# Patient Record
Sex: MALE | Race: WHITE | NOT HISPANIC OR LATINO | Employment: FULL TIME | URBAN - METROPOLITAN AREA
[De-identification: names, ages, dates, MRNs, and addresses within clinical notes are randomized per-mention and may not be internally consistent; named-entity substitution may affect disease eponyms.]

---

## 2018-11-16 NOTE — PROGRESS NOTES
"  SUBJECTIVE:   Walt Tompkins is a 33 year old male who presents to clinic today for the following health issues:      History of Present Illness     Diet:  Regular (no restrictions)  Frequency of exercise:  1 day/week  Duration of exercise:  N/A  Taking medications regularly:  Yes  Medication side effects:  None  Additional concerns today:  Yes     Patient in clinic today to establish care. [facility]     He has bumps under his skin in multiple areas and some are painful.     Answers for HPI/ROS submitted by the patient on 11/21/2018   PHQ-2 Score: 1    Problem list and histories reviewed & adjusted, as indicated.  Additional history: none    ROS:  {ROS COMP:151079}    OBJECTIVE:     /82  Pulse 83  Temp 96.9  F (36.1  C) (Temporal)  Resp 16  Ht 5' 11.65\" (1.82 m)  Wt 243 lb (110.2 kg)  SpO2 97%  BMI 33.28 kg/m2  Body mass index is 33.28 kg/(m^2).  {Exam List:302643}      ASSESSMENT/PLAN:       ICD-10-CM    1. Screening for HIV (human immunodeficiency virus) Z11.4    2. Need for prophylactic vaccination and inoculation against influenza Z23    3. Need for prophylactic vaccination with tetanus-diphtheria (Td) Z23          Can Heard PA-C  [facility]"

## 2018-11-21 ENCOUNTER — OFFICE VISIT (OUTPATIENT)
Dept: FAMILY MEDICINE | Facility: OTHER | Age: 33
End: 2018-11-21
Payer: COMMERCIAL

## 2018-11-21 VITALS
RESPIRATION RATE: 16 BRPM | WEIGHT: 243 LBS | HEART RATE: 83 BPM | OXYGEN SATURATION: 97 % | DIASTOLIC BLOOD PRESSURE: 82 MMHG | SYSTOLIC BLOOD PRESSURE: 108 MMHG | TEMPERATURE: 96.9 F | BODY MASS INDEX: 32.91 KG/M2 | HEIGHT: 72 IN

## 2018-11-21 DIAGNOSIS — H91.92 DECREASED HEARING OF LEFT EAR: ICD-10-CM

## 2018-11-21 DIAGNOSIS — D17.9 LIPOMA, UNSPECIFIED SITE: ICD-10-CM

## 2018-11-21 DIAGNOSIS — E66.09 CLASS 1 OBESITY DUE TO EXCESS CALORIES WITHOUT SERIOUS COMORBIDITY WITH BODY MASS INDEX (BMI) OF 33.0 TO 33.9 IN ADULT: ICD-10-CM

## 2018-11-21 DIAGNOSIS — E66.811 CLASS 1 OBESITY DUE TO EXCESS CALORIES WITHOUT SERIOUS COMORBIDITY WITH BODY MASS INDEX (BMI) OF 33.0 TO 33.9 IN ADULT: ICD-10-CM

## 2018-11-21 DIAGNOSIS — Z23 NEED FOR PROPHYLACTIC VACCINATION WITH TETANUS-DIPHTHERIA (TD): ICD-10-CM

## 2018-11-21 DIAGNOSIS — Z00.00 ENCOUNTER FOR ROUTINE ADULT HEALTH EXAMINATION WITHOUT ABNORMAL FINDINGS: Primary | ICD-10-CM

## 2018-11-21 DIAGNOSIS — Z13.6 CARDIOVASCULAR SCREENING; LDL GOAL LESS THAN 160: ICD-10-CM

## 2018-11-21 DIAGNOSIS — Z13.1 SCREENING FOR DIABETES MELLITUS: ICD-10-CM

## 2018-11-21 DIAGNOSIS — H72.92 PERFORATION OF TYMPANIC MEMBRANE, LEFT: ICD-10-CM

## 2018-11-21 PROCEDURE — 99385 PREV VISIT NEW AGE 18-39: CPT | Mod: 25 | Performed by: PHYSICIAN ASSISTANT

## 2018-11-21 PROCEDURE — 90715 TDAP VACCINE 7 YRS/> IM: CPT | Performed by: PHYSICIAN ASSISTANT

## 2018-11-21 PROCEDURE — 90471 IMMUNIZATION ADMIN: CPT | Performed by: PHYSICIAN ASSISTANT

## 2018-11-21 NOTE — PATIENT INSTRUCTIONS
Preventive Health Recommendations  Male Ages 26 - 39    Yearly exam:             See your health care provider every year in order to  o   Review health changes.   o   Discuss preventive care.    o   Review your medicines if your doctor has prescribed any.    You should be tested each year for STDs (sexually transmitted diseases), if you re at risk.     After age 35, talk to your provider about cholesterol testing. If you are at risk for heart disease, have your cholesterol tested at least every 5 years.     If you are at risk for diabetes, you should have a diabetes test (fasting glucose).  Shots: Get a flu shot each year. Get a tetanus shot every 10 years.     Nutrition:    Eat at least 5 servings of fruits and vegetables daily.     Eat whole-grain bread, whole-wheat pasta and brown rice instead of white grains and rice.     Get adequate Calcium and Vitamin D.     Lifestyle    Exercise for at least 150 minutes a week (30 minutes a day, 5 days a week). This will help you control your weight and prevent disease.     Limit alcohol to one drink per day.     No smoking.     Wear sunscreen to prevent skin cancer.     See your dentist every six months for an exam and cleaning.     If you would like those lipomas removed, let me know.    Follow up in 1 month to recheck the left ear drum perforation. You can try Sudafed or Claritin to help dry up the inner ear fluid.

## 2018-11-21 NOTE — NURSING NOTE
"Chief Complaint   Patient presents with     Establish Care     Panel Management     phq, tetanus, hiv, flu, ashley        Initial /82  Pulse 83  Temp 96.9  F (36.1  C) (Temporal)  Resp 16  Ht 5' 11.65\" (1.82 m)  Wt 243 lb (110.2 kg)  SpO2 97%  BMI 33.28 kg/m2 Estimated body mass index is 33.28 kg/(m^2) as calculated from the following:    Height as of this encounter: 5' 11.65\" (1.82 m).    Weight as of this encounter: 243 lb (110.2 kg).  Medication Reconciliation: complete    Anisa Green, ROBERTO      "

## 2018-11-21 NOTE — PROGRESS NOTES
SUBJECTIVE:   CC: Walt Tompkins is an 33 year old male who presents for preventative health visit.     Healthy Habits:    Do you get at least three servings of calcium containing foods daily (dairy, green leafy vegetables, etc.)? yes    Amount of exercise or daily activities, outside of work: 1 day per week    Problems taking medications regularly not applicable    Medication side effects: No    Have you had an eye exam in the past two years? no    Do you see a dentist twice per year? yes    Do you have sleep apnea, excessive snoring or daytime drowsiness?no     Patient in clinic today to establish care. [facility]     He has bumps under his skin in multiple areas and some are painful. He states his dad has them as well.    He has had decreased hearing in the left ear for the past month or so but it is slowly starting to improve. He does have occasional seasonal allergies. He denies any ear pain.     Today's PHQ-2 Score:   PHQ-2 ( 1999 [employer]) 11/21/2018   Q1: Little interest or pleasure in doing things 0   Q2: Feeling down, depressed or hopeless 1   PHQ-2 Score 1   Q1: Little interest or pleasure in doing things Not at all   Q2: Feeling down, depressed or hopeless Several days   PHQ-2 Score 1       Abuse: Current or Past(Physical, Sexual or Emotional)- No  Do you feel safe in your environment - Yes    Social History   Substance Use Topics     Smoking status: Current Every Day Smoker     Smokeless tobacco: Former User     Alcohol use Yes      Comment: once per week      If you drink alcohol do you typically have >3 drinks per day or >7 drinks per week? No                      Last PSA: No results found for: PSA    Reviewed orders with patient. Reviewed health maintenance and updated orders accordingly - Yes    Reviewed and updated as needed this visit by clinical staff  Tobacco  Allergies  Meds  Problems  Med Hx  Surg Hx  Fam Hx  Soc Hx       "  Reviewed and updated as needed this visit by Provider  Allergies  Meds  Problems        ROS:  GENERAL: Denies fever, fatigue, weakness, weight gain, or weight loss.  HEENT: Eyes-Denies pain, redness, loss of vision, double or blurred vision.     Ears/Nose- +Decreased hearing on the left. Denies tinnitus, epistaxis, decreased sense of smell, nasal congestion. Denies loss of sense of taste, dry mouth, or sore throat.   CARDIOVASCULAR: Denies chest pain, shortness of breath, irregular heartbeats, palpitations, or edema.  RESPIRATORY: Denies cough, hemoptysis, and shortness of breath.  GASTROINTESTINAL: Denies nausea, vomiting, change in appetite, abdominal pain, diarrhea, or constipation.  GENITOURINARY: Denies increased frequency, urgency, dysuria, hematuria, or incontinence.   MUSCULOSKELETAL: Denies myalgias, arthralgias, or muscle weakness.  SKIN: +Nodules under skin. Denies easy bruising, redness, rash, or dry skin.   NEUROLOGIC: Denies headache, fainting, dizziness, memory loss, numbness, tingling, or seizures.  PSYCHIATRIC: Denies depression, anxiety, mood swings, and thoughts of suicide.  ENDOCRINE: Denies heat and cold intolerance, polydipsia, or polyuria.   HEMATOLOGIC/LYMPHATIC: Denies easy bleeding or lymphadenopathy.   ALLERGIC/IMMUNOLOGIC: Denies rhinitis, asthma, skin sensitivity.     OBJECTIVE:   /82  Pulse 83  Temp 96.9  F (36.1  C) (Temporal)  Resp 16  Ht 5' 11.65\" (1.82 m)  Wt 243 lb (110.2 kg)  SpO2 97%  BMI 33.28 kg/m2  EXAM:  GENERAL: healthy, alert and no distress  EYES: Eyes grossly normal to inspection, PERRL and conjunctivae and sclerae normal  HENT: Right ear canal and TM's normal with minimal serous effusion.. Left ear canal normal, left TM with medial TM perforation with serous effusion. No current drainage or erythema. Nose and mouth without ulcers or lesions  NECK: no adenopathy, no asymmetry, masses, or scars and thyroid normal to palpation  RESP: lungs clear to " auscultation - no rales, rhonchi or wheezes  CV: regular rate and rhythm, normal S1 S2, no S3 or S4, no murmur, click or rub, no peripheral edema and peripheral pulses strong  ABDOMEN: soft, nontender, no hepatosplenomegaly, no masses and bowel sounds normal   (male): normal male uncircumcised genitalia without urethral discharge. Small amount of whitish discoloration over the tip of the penis. Nontender. no hernia  MS: no gross musculoskeletal defects noted, no edema. FROM to all extremities. No spinal tenderness.   SKIN: Multiple rubbery, mobile nodules including right forearm, left elbow area and right low back. Right low back lesion slightly tender to palpation.   NEURO: Normal strength and tone, mentation intact and speech normal. Cranial nerves II-XII are grossly intact. DTRs are 2+/4 throughout and symmetric. Gait is stable.  PSYCH: mentation appears normal, affect normal/bright    ASSESSMENT/PLAN:       ICD-10-CM    1. Encounter for routine adult health examination without abnormal findings Z00.00 Basic metabolic panel  (Ca, Cl, CO2, Creat, Gluc, K, Na, BUN)     Lipid panel reflex to direct LDL Fasting   2. Lipoma, unspecified site D17.9    3. Decreased hearing of left ear H91.92    4. Perforation of tympanic membrane, left H72.92    5. Class 1 obesity due to excess calories without serious comorbidity with body mass index (BMI) of 33.0 to 33.9 in adult E66.09     Z68.33    6. CARDIOVASCULAR SCREENING; LDL GOAL LESS THAN 160 Z13.6 Lipid panel reflex to direct LDL Fasting   7. Screening for diabetes mellitus Z13.1 Basic metabolic panel  (Ca, Cl, CO2, Creat, Gluc, K, Na, BUN)   8. Need for prophylactic vaccination with tetanus-diphtheria (Td) Z23 TDAP, IM (10 - 64 YRS) - Adacel       2. Multiple lipomas, some of which are painful at times but he does not want to pursue removal at this time. If he changes his mind or they start to become more painful, he will let me know and will send to generally surgery for  "excision.    3-4. Decreased hearing of left ear recently likely due to TM perforation. I recommend Sudafed or Claritin to help dry up the bilateral TM effusions and will plan on follow up in 1 month to recheck the perforation. If still not healing, will refer to ENT.    5. I discussed the importance of routine exercise at least 3-5 days per week along with a healthier diet, cutting back on the carbs, fatty foods and portion sizes.    6-7. He will completed fasting labs next week.    8. Tdap administered by MA.    He states the whitish discoloration over the tip of the penis has been present for 10 years without change and it is asymptomatic so will continue to monitor. Encouraged to keep foreskin clean.     COUNSELING:  Reviewed preventive health counseling, as reflected in patient instructions       Regular exercise       Healthy diet/nutrition    BP Readings from Last 1 Encounters:   11/21/18 108/82     Estimated body mass index is 33.28 kg/(m^2) as calculated from the following:    Height as of this encounter: 5' 11.65\" (1.82 m).    Weight as of this encounter: 243 lb (110.2 kg).    BP Screening:   Last 3 BP Readings:    BP Readings from Last 3 Encounters:   11/21/18 108/82   10/24/13 123/74     Weight management plan: Discussed healthy diet and exercise guidelines and patient will follow up in 12 months in clinic to re-evaluate.    Patient reports that he has been smoking E-cigs.  He has quit using smokeless tobacco.      Counseling Resources:  ATP IV Guidelines  Pooled Cohorts Equation Calculator  FRAX Risk Assessment  ICSI Preventive Guidelines  Dietary Guidelines for Americans, 2010  USDA's MyPlate  ASA Prophylaxis  Lung CA Screening    Can Heard PA-C  [facility]  "

## 2018-11-21 NOTE — MR AVS SNAPSHOT
After Visit Summary   11/21/2018    Walt Tompkins    MRN: [id number]           Patient Information     Date Of Birth          1985        Visit Information        Provider Department      11/21/2018 3:00 PM Can Heard PA-C [facility]        Today's Diagnoses     Encounter for routine adult health examination without abnormal findings    -  1    Lipoma, unspecified site        Decreased hearing of left ear        Perforation of tympanic membrane, left        Class 1 obesity due to excess calories without serious comorbidity with body mass index (BMI) of 33.0 to 33.9 in adult        CARDIOVASCULAR SCREENING; LDL GOAL LESS THAN 160        Screening for diabetes mellitus        Need for prophylactic vaccination with tetanus-diphtheria (Td)          Care Instructions      Preventive Health Recommendations  Male Ages 26 - 39    Yearly exam:             See your health care provider every year in order to  o   Review health changes.   o   Discuss preventive care.    o   Review your medicines if your doctor has prescribed any.    You should be tested each year for STDs (sexually transmitted diseases), if you re at risk.     After age 35, talk to your provider about cholesterol testing. If you are at risk for heart disease, have your cholesterol tested at least every 5 years.     If you are at risk for diabetes, you should have a diabetes test (fasting glucose).  Shots: Get a flu shot each year. Get a tetanus shot every 10 years.     Nutrition:    Eat at least 5 servings of fruits and vegetables daily.     Eat whole-grain bread, whole-wheat pasta and brown rice instead of white grains and rice.     Get adequate Calcium and Vitamin D.     Lifestyle    Exercise for at least 150 minutes a week (30 minutes a day, 5 days a week). This will help you control your weight and prevent disease.     Limit alcohol to one drink per day.     No smoking.     Wear sunscreen to prevent skin  cancer.     See your dentist every six months for an exam and cleaning.     If you would like those lipomas removed, let me know.    Follow up in 1 month to recheck the left ear drum perforation. You can try Sudafed or Claritin to help dry up the inner ear fluid.            Follow-ups after your visit        Follow-up notes from your care team     Return in about 1 month (around 12/21/2018).      Your next 10 appointments already scheduled     Nov 28, 2018  7:45 AM CST   LAB with [facility]   [facility]    [address] [id number] [phone]           Please do not eat 10-12 hours before your appointment if you are coming in fasting for labs on lipids, cholesterol, or glucose (sugar). This does not apply to pregnant women. Water, hot tea and black coffee (with nothing added) are okay. Do not drink other fluids, diet soda or chew gum.            Dec 19, 2018  3:00 PM CST   Office Visit with Can Heard PA-C   [facility] ([facility]    [address] [id number] [phone]           Bring a current list of meds and any records pertaining to this visit. For Physicals, please bring immunization records and any forms needing to be filled out. Please arrive 10 minutes early to complete paperwork.              Future tests that were ordered for you today     Open Future Orders        Priority Expected Expires Ordered    Basic metabolic panel  (Ca, Cl, CO2, Creat, Gluc, K, Na, BUN) Routine 11/27/2018 11/21/2019 11/21/2018    Lipid panel reflex to direct LDL Fasting Routine 11/27/2018 11/21/2019 11/21/2018            Who to contact     If you have questions or need follow up information about today's clinic visit or your schedule please contact [facility] directly at [phone].  Normal or non-critical lab and imaging results will be communicated to you by MyChart, letter or  "phone within 4 business days after the clinic has received the results. If you do not hear from us within 7 days, please contact the clinic through [employer] or phone. If you have a critical or abnormal lab result, we will notify you by phone as soon as possible.  Submit refill requests through [employer] or call your pharmacy and they will forward the refill request to us. Please allow 3 business days for your refill to be completed.          Additional Information About Your Visit        Care EveryWhere ID     This is your Care EveryWhere ID. This could be used by other organizations to access your [facility] medical records  [id number]        Your Vitals Were     Pulse Temperature Respirations Height Pulse Oximetry BMI (Body Mass Index)    83 96.9  F (36.1  C) (Temporal) 16 5' 11.65\" (1.82 m) 97% 33.28 kg/m2       Blood Pressure from Last 3 Encounters:   11/21/18 108/82   10/24/13 123/74    Weight from Last 3 Encounters:   11/21/18 243 lb (110.2 kg)   10/24/13 212 lb (96.2 kg)              We Performed the Following     TDAP, IM (10 - 64 YRS) - Adacel        Primary Care Provider Office Phone # Fax #    Can Heard PA-C [phone]       [address] [place] [id number]        Equal Access to Services     [facility] : Hadii aad ku hadasho Soomaali, waaxda luqadaha, qaybta kaalmada adeegyada, scott garces hayantony jj . So [facility] [phone].    ATENCIÓN: Si habla español, tiene a mayer disposición servicios gratuitos de asistencia lingüística. Dereje al [phone].    We comply with applicable federal civil rights laws and [place] laws. We do not discriminate on the basis of race, color, national origin, age, disability, sex, sexual orientation, or gender identity.            Thank you!     Thank you for choosing [facility]  for your care. Our goal is always to provide you with excellent care. Hearing back from our patients is one way we can continue to " improve our services. Please take a few minutes to complete the written survey that you may receive in the mail after your visit with us. Thank you!             Your Updated Medication List - Protect others around you: Learn how to safely use, store and throw away your medicines at www.disposemymeds.org.      Notice  As of 11/21/2018  3:28 PM    You have not been prescribed any medications.

## 2018-11-21 NOTE — NURSING NOTE
Screening Questionnaire for Adult Immunization    Are you sick today?   No   Do you have allergies to medications, food, a vaccine component or latex?   No   Have you ever had a serious reaction after receiving a vaccination?   No   Do you have a long-term health problem with heart disease, lung disease, asthma, kidney disease, metabolic disease (e.g. diabetes), anemia, or other blood disorder?   No   Do you have cancer, leukemia, HIV/AIDS, or any other immune system problem?   No   In the past 3 months, have you taken medications that affect  your immune system, such as prednisone, other steroids, or anticancer drugs; drugs for the treatment of rheumatoid arthritis, Crohn s disease, or psoriasis; or have you had radiation treatments?   No   Have you had a seizure, or a brain or other nervous system problem?   No   During the past year, have you received a transfusion of blood or blood     products, or been given immune (gamma) globulin or antiviral drug?   No   For women: Are you pregnant or is there a chance you could become        pregnant during the next month?   No   Have you received any vaccinations in the past 4 weeks?   No     Immunization questionnaire answers were all negative.        Per orders of DANNIE, injection of Adacel given by Anisa Green. Patient instructed to remain in clinic for 15 minutes afterwards, and to report any adverse reaction to me immediately.    Prior to injection verified patient identity using patient's name and date of birth. Anisa Green, CMA     Screening performed by Anisa Green on 11/21/2018 at 3:32 PM.

## 2019-05-08 NOTE — PROGRESS NOTES
"  SUBJECTIVE:   Walt Tompkins is a 33 year old male who presents to clinic today for the following health issues:    HPI     Patient in clinic today to discuss prescription to quit nicotine. He was a previous tobacco chewer for 7 years, quitting a year and a half ago and going to the E cigarette instead. He is down to 3 mg of nicotine, going through a 60 mL bottle every 7 days. He has tried patches and gum in the past without benefit. He would like to discuss Chantix or Wellbutrin. He has never smoked cigarettes.     Additional history: none    Reviewed and updated as needed this visit by clinical staff  Tobacco  Allergies  Meds  Problems  Med Hx  Surg Hx  Fam Hx  Soc Hx        Reviewed and updated as needed this visit by Provider  Tobacco  Allergies  Meds  Problems  Med Hx  Surg Hx  Fam Hx       ROS:  GENERAL: Denies fever, fatigue, weakness, weight gain, or weight loss.  CARDIOVASCULAR: Denies chest pain, shortness of breath, irregular heartbeats, palpitations, or edema.  RESPIRATORY: Denies cough, hemoptysis, and shortness of breath.  PSYCHIATRIC: Denies depression, anxiety, mood swings, and thoughts of suicide.    OBJECTIVE:     /78   Pulse 78   Temp 99.5  F (37.5  C) (Temporal)   Resp 16   Ht 1.816 m (5' 11.5\")   Wt 105.2 kg (232 lb)   SpO2 99%   BMI 31.91 kg/m    Body mass index is 31.91 kg/m .  GENERAL: healthy, alert and no distress  RESP: lungs clear to auscultation - no rales, rhonchi or wheezes  CV: regular rate and rhythm, normal S1 S2, no S3 or S4, no murmur, click or rub      ASSESSMENT/PLAN:       ICD-10-CM    1. Nicotine abuse Z72.0 varenicline (CHANTIX STARTING MONTH PAK) 0.5 MG X 11 & 1 MG X 42 tablet     varenicline (CHANTIX CONTINUING MONTH PAK) 1 MG tablet       He would like to quit nicotine use, specifically his E-cig. He has tried nicotine gum and patches without benefit. I recommend trying Chantix. I discussed common side effects, the most common of which is " abnormal dreams. He does not have a history of anxiety or depression. He will take this as prescribed and if he does not tolerate it or it is not beneficial, he will let me know. I recommend he take it for at least 3 months and set a quit date for 1 week after starting it.       Can Heard PA-C  [facility]

## 2019-05-15 ENCOUNTER — OFFICE VISIT (OUTPATIENT)
Dept: FAMILY MEDICINE | Facility: OTHER | Age: 34
End: 2019-05-15
Payer: COMMERCIAL

## 2019-05-15 VITALS
RESPIRATION RATE: 16 BRPM | SYSTOLIC BLOOD PRESSURE: 122 MMHG | HEART RATE: 78 BPM | DIASTOLIC BLOOD PRESSURE: 78 MMHG | WEIGHT: 232 LBS | BODY MASS INDEX: 31.42 KG/M2 | OXYGEN SATURATION: 99 % | TEMPERATURE: 99.5 F | HEIGHT: 72 IN

## 2019-05-15 DIAGNOSIS — Z72.0 NICOTINE ABUSE: Primary | ICD-10-CM

## 2019-05-15 PROCEDURE — 99213 OFFICE O/P EST LOW 20 MIN: CPT | Performed by: PHYSICIAN ASSISTANT

## 2019-05-15 RX ORDER — VARENICLINE TARTRATE 1 MG/1
1 TABLET, FILM COATED ORAL 2 TIMES DAILY
Qty: 60 TABLET | Refills: 2 | Status: SHIPPED | OUTPATIENT
Start: 2019-05-15 | End: 2019-05-17

## 2019-05-15 ASSESSMENT — MIFFLIN-ST. JEOR: SCORE: 2037.41

## 2019-05-15 ASSESSMENT — PAIN SCALES - GENERAL: PAINLEVEL: NO PAIN (0)

## 2019-05-15 NOTE — PATIENT INSTRUCTIONS
Will start you on Chantix to take as directed.  If you have any intolerable side effects, let me know.  Set a quit date for a week after starting this.  If not helping you quit, let me know.

## 2019-05-16 DIAGNOSIS — Z72.0 NICOTINE ABUSE: ICD-10-CM

## 2019-05-16 NOTE — TELEPHONE ENCOUNTER
Reason for Call:  Medication or medication refill:    Do you use a [facility]?  Name [facility]    Name of the medication requested: chantix    Other request: Patient requested refill from pharmacy. He would like refill asap    Can we leave a detailed message on this number? YES    Phone number patient can be reached at: Home number on file [phone] (home)    Best Time: any    Call taken on 5/16/2019 at 3:47 PM by Seema Mohan

## 2019-05-16 NOTE — TELEPHONE ENCOUNTER
[facility].     Jamila Hammond MA

## 2019-05-17 RX ORDER — VARENICLINE TARTRATE 1 MG/1
1 TABLET, FILM COATED ORAL 2 TIMES DAILY
Qty: 60 TABLET | Refills: 2 | Status: SHIPPED | OUTPATIENT
Start: 2019-05-17 | End: 2019-08-01

## 2019-07-08 ENCOUNTER — OFFICE VISIT (OUTPATIENT)
Dept: FAMILY MEDICINE | Facility: OTHER | Age: 34
End: 2019-07-08
Payer: COMMERCIAL

## 2019-07-08 VITALS
RESPIRATION RATE: 16 BRPM | BODY MASS INDEX: 32.23 KG/M2 | TEMPERATURE: 98 F | WEIGHT: 238 LBS | HEIGHT: 72 IN | HEART RATE: 78 BPM | OXYGEN SATURATION: 98 % | DIASTOLIC BLOOD PRESSURE: 82 MMHG | SYSTOLIC BLOOD PRESSURE: 120 MMHG

## 2019-07-08 DIAGNOSIS — D17.9 LIPOMA, UNSPECIFIED SITE: ICD-10-CM

## 2019-07-08 DIAGNOSIS — Z13.1 SCREENING FOR DIABETES MELLITUS: ICD-10-CM

## 2019-07-08 DIAGNOSIS — H61.22 IMPACTED CERUMEN OF LEFT EAR: ICD-10-CM

## 2019-07-08 DIAGNOSIS — M10.9 GOUT INVOLVING TOE, UNSPECIFIED CAUSE, UNSPECIFIED CHRONICITY, UNSPECIFIED LATERALITY: Primary | ICD-10-CM

## 2019-07-08 DIAGNOSIS — Z13.6 CARDIOVASCULAR SCREENING; LDL GOAL LESS THAN 160: ICD-10-CM

## 2019-07-08 LAB
ANION GAP SERPL CALCULATED.3IONS-SCNC: 8 MMOL/L (ref 3–14)
BUN SERPL-MCNC: 15 MG/DL (ref 7–30)
CALCIUM SERPL-MCNC: 9.4 MG/DL (ref 8.5–10.1)
CHLORIDE SERPL-SCNC: 106 MMOL/L (ref 94–109)
CHOLEST SERPL-MCNC: 185 MG/DL
CO2 SERPL-SCNC: 26 MMOL/L (ref 20–32)
CREAT SERPL-MCNC: 0.81 MG/DL (ref 0.66–1.25)
GFR SERPL CREATININE-BSD FRML MDRD: >90 ML/MIN/{1.73_M2}
GLUCOSE SERPL-MCNC: 95 MG/DL (ref 70–99)
HDLC SERPL-MCNC: 40 MG/DL
LDLC SERPL CALC-MCNC: 97 MG/DL
NONHDLC SERPL-MCNC: 145 MG/DL
POTASSIUM SERPL-SCNC: 4.1 MMOL/L (ref 3.4–5.3)
SODIUM SERPL-SCNC: 140 MMOL/L (ref 133–144)
TRIGL SERPL-MCNC: 240 MG/DL
URATE SERPL-MCNC: 8.2 MG/DL (ref 3.5–7.2)

## 2019-07-08 PROCEDURE — 80061 LIPID PANEL: CPT | Performed by: PHYSICIAN ASSISTANT

## 2019-07-08 PROCEDURE — 36415 COLL VENOUS BLD VENIPUNCTURE: CPT | Performed by: PHYSICIAN ASSISTANT

## 2019-07-08 PROCEDURE — 80048 BASIC METABOLIC PNL TOTAL CA: CPT | Performed by: PHYSICIAN ASSISTANT

## 2019-07-08 PROCEDURE — 84550 ASSAY OF BLOOD/URIC ACID: CPT | Performed by: PHYSICIAN ASSISTANT

## 2019-07-08 PROCEDURE — 99213 OFFICE O/P EST LOW 20 MIN: CPT | Mod: 25 | Performed by: PHYSICIAN ASSISTANT

## 2019-07-08 PROCEDURE — 69210 REMOVE IMPACTED EAR WAX UNI: CPT | Mod: LT | Performed by: PHYSICIAN ASSISTANT

## 2019-07-08 RX ORDER — ALLOPURINOL 100 MG/1
100 TABLET ORAL DAILY
Qty: 30 TABLET | Refills: 5 | Status: SHIPPED | OUTPATIENT
Start: 2019-07-08 | End: 2020-08-06

## 2019-07-08 ASSESSMENT — MIFFLIN-ST. JEOR: SCORE: 2064.62

## 2019-07-08 NOTE — PATIENT INSTRUCTIONS
Will send you to Dr. Maurice to discuss removal of the lipomas.    Will start you on allopurinol to help with the gout and I recommend avoiding exacerbating foods.  Drink plenty of water and continue to work on weight loss with routine exercise.    Follow up in 6 months for a recheck.

## 2019-07-08 NOTE — PROGRESS NOTES
"Subjective     Walt Tompkins is a 33 year old male who presents to clinic today for the following health issues:    History of Present Illness        He eats 2-3 servings of fruits and vegetables daily.He consumes 2 sweetened beverage(s) daily.  He is taking medications regularly.     Gout/ single inflamed joint   Onset: \"first flared up 1 year ago\"    Description:   Location: \"ball of left foot and left knee\"  Joint Swelling: no   Redness: no   Pain: YES    Intensity: \"depends on if I had meat the day before\"    Progression of Symptoms:  intermittent    Accompanying Signs & Symptoms:  Fevers: no     History:   Trauma to the area: no   Previous history of gout: YES   Recent illness:  no     Precipitating factors:   Diet-rich in purine: seafood and red meat - not often  Alcohol use: no   Diuretic use: no     Alleviating factors:  Heat, a lot of water    Therapies Tried and outcome: Aleve - previously had a gout medication from urgent care and it was for 2 weeks    He was diagnosed with his first episode of gout 1 year ago in the right great toe. Ever since then, he has intermittent pain in the left great toe and knee when he eats meat or drinks alcohol. He is wondering if he can get on a medication to help prevent gout. He has cut back significantly on his meat eating and alcohol use.     He has been tobacco free for 40 days.    Concern - Lipoma  Onset: 6 months    Description:   \"both sides of my stomach and on my arms\"    Progression of Symptoms:  worsening    Accompanying Signs & Symptoms:  Pain  Might be getting slightly bigger    Previous history of similar problem:   no    Precipitating factors:   Worsened by: NA    Alleviating factors:  Improved by: NA    Therapies Tried and outcome: none    He has multiple painful lipomas over his abdomen and arms and would like them removed.    He has had decreased hearing in his left ear for the past week or so and wants to see if there is any wax build up. He denies " "any pain or discharge.     Reviewed and updated as needed this visit by Provider  Tobacco  Allergies  Meds  Problems  Med Hx  Surg Hx  Fam Hx      Review of Systems   GENERAL: Denies fever, fatigue, weakness, weight gain, or weight loss.  HEENT: Eyes-Denies pain, redness, loss of vision, double or blurred vision.     Ears/Nose- +Decreased hearing left ear. Denies tinnitus, epistaxis, decreased sense of smell, nasal congestion. Denies loss of sense of taste, dry mouth, or sore throat.   CARDIOVASCULAR: Denies chest pain, shortness of breath, irregular heartbeats, palpitations, or edema.  RESPIRATORY: Denies cough, hemoptysis, and shortness of breath.  SKIN: +Multiple painful lipomas.   MUSCULOSKELETAL: +Intermittent pain of left knee and left great toe.   NEUROLOGIC: Denies headache, fainting, dizziness, memory loss, numbness, tingling, or seizures.      Objective    /82   Pulse 78   Temp 98  F (36.7  C) (Temporal)   Resp 16   Ht 1.816 m (5' 11.5\")   Wt 108 kg (238 lb)   SpO2 98%   BMI 32.73 kg/m    Body mass index is 32.73 kg/m .  Physical Exam   GENERAL: healthy, alert and no distress  EARS: Right ear canal and TM clear. Left ear canal occluded by cerumen. Once removed, TM visualized and clear.   RESP: lungs clear to auscultation - no rales, rhonchi or wheezes  CV: regular rate and rhythm, normal S1 S2, no S3 or S4, no murmur, click or rub  NEURO: Normal strength and tone, mentation intact and speech normal. Gait is stable.  SKIN: Tender, rubbery nodules over lateral lower abdomen bilaterally along with right forearm. Multiple nontender rubbery nodules over bilateral forearms.         Assessment & Plan       ICD-10-CM    1. Gout involving toe, unspecified cause, unspecified chronicity, unspecified laterality M10.9 Uric acid     allopurinol (ZYLOPRIM) 100 MG tablet   2. Lipoma, unspecified site D17.9 GENERAL SURG ADULT REFERRAL   3. Impacted cerumen of left ear H61.22 REMOVE IMPACTED CERUMEN   4. " CARDIOVASCULAR SCREENING; LDL GOAL LESS THAN 160 Z13.6 Lipid panel reflex to direct LDL Fasting   5. Screening for diabetes mellitus Z13.1 Basic metabolic panel  (Ca, Cl, CO2, Creat, Gluc, K, Na, BUN)        1. He had his first episode of gout 1 year ago and has had intermittent pain in the left great toe and knee over the past few months when eating meat or drinking alcohol. I discussed the importance of avoiding excess red meats, seafood and alcohol and he has been cutting down. Will start allopurinol 100 mg daily. Will check uric acid today and will plan on recheck in 6 months unless levels are significantly elevated then will recheck in 2 months. He will continue to work on weight loss and plenty of fluids as well.    2. Will refer him to general surgery to discuss lipoma removal.    3. I removed a large, hard piece of impacted cerumen from his left ear with a cerumen spoon and he tolerated this procedure well.     4-5. Updated non-fasting screening labs ordered.    Follow up in 6 months.       Can Heard PA-C  [facility]

## 2019-07-15 ENCOUNTER — TELEPHONE (OUTPATIENT)
Dept: SURGERY | Facility: CLINIC | Age: 34
End: 2019-07-15

## 2019-07-15 ENCOUNTER — OFFICE VISIT (OUTPATIENT)
Dept: SURGERY | Facility: OTHER | Age: 34
End: 2019-07-15
Attending: PHYSICIAN ASSISTANT
Payer: COMMERCIAL

## 2019-07-15 VITALS
WEIGHT: 235.75 LBS | HEIGHT: 72 IN | SYSTOLIC BLOOD PRESSURE: 126 MMHG | TEMPERATURE: 97.2 F | BODY MASS INDEX: 31.93 KG/M2 | DIASTOLIC BLOOD PRESSURE: 74 MMHG

## 2019-07-15 DIAGNOSIS — D17.1 LIPOMA OF BACK: ICD-10-CM

## 2019-07-15 DIAGNOSIS — D17.20 LIPOMA OF EXTREMITY: ICD-10-CM

## 2019-07-15 DIAGNOSIS — D17.1 ABDOMINAL LIPOMA: Primary | ICD-10-CM

## 2019-07-15 PROCEDURE — 99243 OFF/OP CNSLTJ NEW/EST LOW 30: CPT | Performed by: SURGERY

## 2019-07-15 ASSESSMENT — MIFFLIN-ST. JEOR: SCORE: 2054.41

## 2019-07-15 NOTE — LETTER
7/15/2019         RE: Walt Tompkins  [address] [place] [facility] [address]        Dear Colleague,    Thank you for referring your patient, Walt Tompkins, to the [facility]. Please see a copy of my visit note below.    General Surgery Consultation    Walt Tompkins MRN# [id number]   Age: 33 year old YOB: 1985     Reason for consult: Multiple lipomas                        Assessment and Plan:   I was asked to see this patient at the request of FLOR Alba for evaluation of multiple lipomas.  Walt Tompkins is a 33 year old male who presented with history, exam, laboratory and imaging most consistent with:        ICD-10-CM    1. Abdominal lipoma D17.1 Ewa-Operative Worksheet   2. Lipoma of extremity D17.79 Ewa-Operative Worksheet   3. Lipoma of back D17.1 Ewa-Operative Worksheet       Patient has multiple lipomas through his upper extremities, back, and anterior abdomen.  Thus I recommend we do the back lipomas and a few on the upper extremities were it would be difficult with him being asleep - we can do these int he office while he is awake and as long as he can tolerates them.  The ones along his abdomen are smaller and deeper, thus I recommend we excised these in the OR while he's asleep along with the rest of the upper extremities lipomas that were not removed in the office.  I explained the risks, benefits, and altternatives; he showed understanding and wishes to proceed.      I thank FLOR Alba for the opportunity to participate in the patient's care.           Chief Complaint:   Multiple lipomas     History is obtained from the patient         History of Present Illness:   This patient is a 33 year old  male without a significant past medical history who presents with multiple lipomas throughout his body.  Had them for years; the ones on his upper extremities increasizing in size and number.  Some are causing him pain; thus he  would like to remove them.  No hx of sarcomas; no hx of cancer.  He has multiple throughout his abdomen and a ~3cm in his back.  No issues with anestheisa.  No allergies.  He would like to watch some of his lipomas removed, especially the bigger ones on his arms.            Past Medical History:    has no past medical history on file.          Past Surgical History:   No past surgical history on file.        Medications:     Current Outpatient Medications on File Prior to Visit:  allopurinol (ZYLOPRIM) 100 MG tablet Take 1 tablet (100 mg) by mouth daily   varenicline (CHANTIX CONTINUING MONTH PAK) 1 MG tablet Take 1 tablet (1 mg) by mouth 2 times daily Begin after the starter pack   varenicline (CHANTIX STARTING MONTH PAK) 0.5 MG X 11 & 1 MG X 42 tablet Take 0.5 mg tab daily for 3 days, THEN 0.5 mg tab twice daily for 4 days, THEN 1 mg twice daily.     No current facility-administered medications on file prior to visit.       Allergies:    No Known Allergies         Social History:   Walt Tompkins  reports that he quit smoking about 2 months ago. His smoking use included cigarettes. He has quit using smokeless tobacco. He reports that he drinks alcohol. He reports that he does not use drugs.          Family History:   The patient has no family history of any bleeding, clotting or anesthesia problems.          Review of Systems:     Constitutional: Denies fever or chills   Eyes: Denies change in visual acuity   HENT: Denies nasal congestion or sore throat   Respiratory: Denies cough or shortness of breath   Cardiovascular: Denies chest pain or edema   GI: Denies abdominal pain, nausea, vomiting, bloody stools or diarrhea   : Denies dysuria   Musculoskeletal: Denies back pain or joint pain   Integument: Denies rash   Neurologic: Denies headache, focal weakness or sensory changes   Endocrine: Denies polyuria or polydipsia   Lymphatic: Denies swollen glands   Psychiatric: Denies depression or anxiety           "Physical Exam:     Constitutional: Awake, alert, no acute distress.  Eyes:  No scleral icterus.  Conjunctiva are without injection.  ENMT: Mucous membranes moist, dentition and gums are intact.   Neck: Soft, supple, trachea midline.    Endocrine: The thyroid is without masses and mobile with swallow.   Lymphatic: There is no cervical, submandibular adenopathy.  Respiratory: Lungs are clear to auscultation and percussion bilaterally.   Cardiovascular: Regular rate and rhythm. No murmurs, rubs, or gallops.    Abdomen: Non-distended, non-tender, normoactive bowel sounds present, No masses,   Musculoskeletal: No spinal or CVA tenderness. Full range of motion in the upper and lower extremities.    Skin: multipole lipomas throughout his body; 6 noted at LUE, 5 at RUE, 6 at bilateral sides of abdomen; 1 on the back.    Neurologic: Cranial nerves II through XII are grossly intact and symmetric.  Psychiatric: The patient is alert and oriented times 3.  The patient's affect is not blunted and mood is appropriate.          Data:   Vital Signs:  /74   Temp 97.2  F (36.2  C) (Temporal)   Ht 1.816 m (5' 11.5\")   Wt 106.9 kg (235 lb 12 oz)   BMI 32.42 kg/m        WBC - No results found for: WBC], HgB - No results found for: HGB]   Liver Function Studies - No results for input(s): PROTTOTAL, ALBUMIN, BILITOTAL, ALKPHOS, AST, ALT, BILIDIRECT in the last 52747 hours.  No results found for this or any previous visit (from the past 744 hour(s)).     Nila Maurice DO 7/16/2019 10:52 PM           Again, thank you for allowing me to participate in the care of your patient.        Sincerely,        RicardoChandan Maurice MD    "

## 2019-07-15 NOTE — TELEPHONE ENCOUNTER
Type of surgery: Excision of multiple lipomas - bilateral upper extremities and abdomen  Location of surgery: [facility]  Date and time of surgery: 8/5  Surgeon: Kaylene  Pre-Op Appt Date: 7/31  Post-Op Appt Date: 8/19  Packet sent out: Not Applicable  Pre-cert/Authorization completed:  Not Applicable  Date: na

## 2019-07-17 NOTE — PATIENT INSTRUCTIONS
Before Your Surgery    Call your surgeon if there is any change in your health. This includes signs of a cold or flu (such as a sore throat, runny nose, cough, rash or fever).    Do not smoke, drink alcohol or take over the counter medicine (unless your surgeon or primary care doctor tells you to) for the 24 hours before and after surgery.    If you take prescribed drugs: Follow your doctor s orders about which medicines to take and which to stop until after surgery. Hold Aleve 3 days before surgery.    Eating and drinking prior to surgery: follow the instructions from your surgeon    Take a shower or bath the night before surgery. Use the soap your surgeon gave you to gently clean your skin. If you do not have soap from your surgeon, use your regular soap. Do not shave or scrub the surgery site.  Wear clean pajamas and have clean sheets on your bed.     Will send you to ENT to further evaluate your hearing and perforated left eardrum.

## 2019-07-17 NOTE — PROGRESS NOTES
[address]  [facility] [address]  [phone]  Dept: [phone]    PRE-OP EVALUATION:  Today's date: 2019    Walt Tompkins (: 1985) presents for pre-operative evaluation assessment as requested by Dr. Maurice.  He requires evaluation and anesthesia risk assessment prior to undergoing surgery/procedure for treatment of lipoma removal.    Fax number for surgical facility: available electronically  Primary Physician: Can Heard  Type of Anesthesia Anticipated: General    Patient has a Health Care Directive or Living Will:  YES    Preop Questions 2019   Who is doing your surgery? Dr. Maurice   What are you having done? Lipoma removal   Date of Surgery/Procedure: 19   Facility or Hospital where procedure/surgery will be performed: [facility]   1.  Do you have a history of Heart attack, stroke, stent, coronary bypass surgery, or other heart surgery? No   2.  Do you ever have any pain or discomfort in your chest? No   3.  Do you have a history of  Heart Failure? No   4.   Are you troubled by shortness of breath when:  walking on a level surface, or up a slight hill, or at night? No   5.  Do you currently have a cold, bronchitis or other respiratory infection? No   6.  Do you have a cough, shortness of breath, or wheezing? No   7.  Do you sometimes get pains in the calves of your legs when you walk? No   8. Do you or anyone in your family have previous history of blood clots? No   9.  Do you or does anyone in your family have a serious bleeding problem such as prolonged bleeding following surgeries or cuts? No   10. Have you ever had problems with anemia or been told to take iron pills? No   11. Have you had any abnormal blood loss such as black, tarry or bloody stools? No   12. Have you ever had a blood transfusion? No   13. Have you or any of your relatives ever had problems with anesthesia? No   14. Do you have sleep apnea, excessive  snoring or daytime drowsiness? YES - sleep apnea   15. Do you have any prosthetic heart valves? No   16. Do you have prosthetic joints? No         HPI:     HPI related to upcoming procedure:   He had multiple lipomas on his arms and low back removed in the clinic with Dr. Maurice recently and will be undergoing further removal of remaining arm lipomas and tender abdominal lipomas under general anaesthesia with Dr. Maurice on 8/5/19.     See problem list for active medical problems.  Problems all longstanding and stable, except as noted/documented.  See ROS for pertinent symptoms related to these conditions.    He continues to have decreased hearing in the left ear over the past 8 months or so. It has not worsened and he denies any pain.    MEDICAL HISTORY:     Patient Active Problem List    Diagnosis Date Noted     CORY (obstructive sleep apnea) 07/31/2019     Priority: Medium     Gout involving toe, unspecified cause, unspecified chronicity, unspecified laterality 07/08/2019     Priority: Medium     Class 1 obesity due to excess calories without serious comorbidity with body mass index (BMI) of 33.0 to 33.9 in adult 11/21/2018     Priority: Medium     Lipoma, unspecified site 11/21/2018     Priority: Medium      History reviewed. No pertinent past medical history.  History reviewed. No pertinent surgical history.  Current Outpatient Medications   Medication Sig Dispense Refill     allopurinol (ZYLOPRIM) 100 MG tablet Take 1 tablet (100 mg) by mouth daily 30 tablet 5     varenicline (CHANTIX CONTINUING MONTH PAK) 1 MG tablet Take 1 tablet (1 mg) by mouth 2 times daily Begin after the starter pack 60 tablet 2     varenicline (CHANTIX STARTING MONTH PAK) 0.5 MG X 11 & 1 MG X 42 tablet Take 0.5 mg tab daily for 3 days, THEN 0.5 mg tab twice daily for 4 days, THEN 1 mg twice daily. 53 tablet 0     OTC products: NSAIDS    No Known Allergies   Latex Allergy: NO    Social History     Tobacco Use     Smoking status: Former Smoker     " Last attempt to quit: 5/15/2019     Years since quittin.2     Smokeless tobacco: Former User     Tobacco comment: E cig, used to chew   Substance Use Topics     Alcohol use: Yes     Comment: once per week     History   Drug Use No       REVIEW OF SYSTEMS:   CONSTITUTIONAL: NEGATIVE for fever, chills, change in weight  INTEGUMENTARY/SKIN: +Multiple painful lipomas.   EYES: NEGATIVE for vision changes or irritation  ENT/MOUTH: +Decreased hearing left ear. NEGATIVE for mouth and throat problems  RESP: NEGATIVE for significant cough or SOB  CV: NEGATIVE for chest pain, palpitations or peripheral edema  GI: NEGATIVE for nausea, abdominal pain, heartburn, or change in bowel habits  : NEGATIVE for frequency, dysuria, or hematuria  MUSCULOSKELETAL: NEGATIVE for significant arthralgias or myalgia  NEURO: NEGATIVE for weakness, dizziness or paresthesias  ENDOCRINE: NEGATIVE for temperature intolerance, skin/hair changes  HEME: NEGATIVE for bleeding problems  PSYCHIATRIC: NEGATIVE for changes in mood or affect    EXAM:   /72   Pulse 83   Temp 97.9  F (36.6  C) (Temporal)   Resp 16   Ht 1.816 m (5' 11.5\")   Wt 108.9 kg (240 lb)   SpO2 97%   BMI 33.01 kg/m      GENERAL APPEARANCE: healthy, alert and no distress     EYES: EOMI,  PERRL     HENT: ear canals and right TM normal. Left TM with large medial perforation. Nose and mouth without ulcers or lesions     NECK: no adenopathy, no asymmetry, masses, or scars and thyroid normal to palpation     RESP: lungs clear to auscultation - no rales, rhonchi or wheezes     CV: regular rates and rhythm, normal S1 S2, no S3 or S4 and no murmur, click or rub     ABDOMEN:  soft, nontender, no HSM or masses and bowel sounds normal     MS: extremities normal- no gross deformities noted, no evidence of inflammation in joints, FROM in all extremities.     SKIN: Multiple rubbery nodules over bilateral forearms and abdomen.      NEURO: Normal strength and tone, sensory exam " grossly normal, mentation intact and speech normal. Gait is stable.      PSYCH: mentation appears normal. and affect normal/bright     LYMPHATICS: No cervical adenopathy    DIAGNOSTICS:   EKG: Not indicated due to non-vascular surgery and low risk of event (age <65 and without cardiac risk factors)    Recent Labs   Lab Test 07/08/19  0953      POTASSIUM 4.1   CR 0.81     IMPRESSION:   Reason for surgery/procedure: multiple lipomas  Diagnosis/reason for consult: pre-operative clearance    The proposed surgical procedure is considered INTERMEDIATE risk.    REVISED CARDIAC RISK INDEX  The patient has the following serious cardiovascular risks for perioperative complications such as (MI, PE, VFib and 3  AV Block):  No serious cardiac risks  INTERPRETATION: 0 risks: Class I (very low risk - 0.4% complication rate)    The patient has the following additional risks for perioperative complications:  No identified additional risks      ICD-10-CM    1. Preop general physical exam Z01.818    2. Lipoma, unspecified site D17.9    3. CORY (obstructive sleep apnea) G47.33    4. Decreased hearing of left ear H91.92 OTOLARYNGOLOGY REFERRAL     AUDIOLOGY ADULT REFERRAL   5. Perforation of tympanic membrane, left H72.92 OTOLARYNGOLOGY REFERRAL       Cleared for surgery.    Still has perforation of left TM with decreased hearing so will send to ENT and audiology for further evaluation.    RECOMMENDATIONS:       --Patient is to take all scheduled medications on the day of surgery EXCEPT for modifications listed below.    Anticoagulant or Antiplatelet Medication Use  NSAIDS: Naproxen (Naprosyn):   Stop 2-3 days prior to surgery        APPROVAL GIVEN to proceed with proposed procedure, without further diagnostic evaluation       Signed Electronically by: Can Heard PA-C    Copy of this evaluation report is provided to requesting physician.    [facility] Preop Guidelines    Revised Cardiac Risk Index

## 2019-07-17 NOTE — H&P (VIEW-ONLY)
[address]  [facility] [address]  [phone]  Dept: [phone]    PRE-OP EVALUATION:  Today's date: 2019    Walt Tompkins (: 1985) presents for pre-operative evaluation assessment as requested by Dr. Maurice.  He requires evaluation and anesthesia risk assessment prior to undergoing surgery/procedure for treatment of lipoma removal.    Fax number for surgical facility: available electronically  Primary Physician: Can Heard  Type of Anesthesia Anticipated: General    Patient has a Health Care Directive or Living Will:  YES    Preop Questions 2019   Who is doing your surgery? Dr. Maurice   What are you having done? Lipoma removal   Date of Surgery/Procedure: 19   Facility or Hospital where procedure/surgery will be performed: [facility]   1.  Do you have a history of Heart attack, stroke, stent, coronary bypass surgery, or other heart surgery? No   2.  Do you ever have any pain or discomfort in your chest? No   3.  Do you have a history of  Heart Failure? No   4.   Are you troubled by shortness of breath when:  walking on a level surface, or up a slight hill, or at night? No   5.  Do you currently have a cold, bronchitis or other respiratory infection? No   6.  Do you have a cough, shortness of breath, or wheezing? No   7.  Do you sometimes get pains in the calves of your legs when you walk? No   8. Do you or anyone in your family have previous history of blood clots? No   9.  Do you or does anyone in your family have a serious bleeding problem such as prolonged bleeding following surgeries or cuts? No   10. Have you ever had problems with anemia or been told to take iron pills? No   11. Have you had any abnormal blood loss such as black, tarry or bloody stools? No   12. Have you ever had a blood transfusion? No   13. Have you or any of your relatives ever had problems with anesthesia? No   14. Do you have sleep apnea, excessive  snoring or daytime drowsiness? YES - sleep apnea   15. Do you have any prosthetic heart valves? No   16. Do you have prosthetic joints? No         HPI:     HPI related to upcoming procedure:   He had multiple lipomas on his arms and low back removed in the clinic with Dr. Maurice recently and will be undergoing further removal of remaining arm lipomas and tender abdominal lipomas under general anaesthesia with Dr. Maurice on 8/5/19.     See problem list for active medical problems.  Problems all longstanding and stable, except as noted/documented.  See ROS for pertinent symptoms related to these conditions.    He continues to have decreased hearing in the left ear over the past 8 months or so. It has not worsened and he denies any pain.    MEDICAL HISTORY:     Patient Active Problem List    Diagnosis Date Noted     CORY (obstructive sleep apnea) 07/31/2019     Priority: Medium     Gout involving toe, unspecified cause, unspecified chronicity, unspecified laterality 07/08/2019     Priority: Medium     Class 1 obesity due to excess calories without serious comorbidity with body mass index (BMI) of 33.0 to 33.9 in adult 11/21/2018     Priority: Medium     Lipoma, unspecified site 11/21/2018     Priority: Medium      History reviewed. No pertinent past medical history.  History reviewed. No pertinent surgical history.  Current Outpatient Medications   Medication Sig Dispense Refill     allopurinol (ZYLOPRIM) 100 MG tablet Take 1 tablet (100 mg) by mouth daily 30 tablet 5     varenicline (CHANTIX CONTINUING MONTH PAK) 1 MG tablet Take 1 tablet (1 mg) by mouth 2 times daily Begin after the starter pack 60 tablet 2     varenicline (CHANTIX STARTING MONTH PAK) 0.5 MG X 11 & 1 MG X 42 tablet Take 0.5 mg tab daily for 3 days, THEN 0.5 mg tab twice daily for 4 days, THEN 1 mg twice daily. 53 tablet 0     OTC products: NSAIDS    No Known Allergies   Latex Allergy: NO    Social History     Tobacco Use     Smoking status: Former Smoker     " Last attempt to quit: 5/15/2019     Years since quittin.2     Smokeless tobacco: Former User     Tobacco comment: E cig, used to chew   Substance Use Topics     Alcohol use: Yes     Comment: once per week     History   Drug Use No       REVIEW OF SYSTEMS:   CONSTITUTIONAL: NEGATIVE for fever, chills, change in weight  INTEGUMENTARY/SKIN: +Multiple painful lipomas.   EYES: NEGATIVE for vision changes or irritation  ENT/MOUTH: +Decreased hearing left ear. NEGATIVE for mouth and throat problems  RESP: NEGATIVE for significant cough or SOB  CV: NEGATIVE for chest pain, palpitations or peripheral edema  GI: NEGATIVE for nausea, abdominal pain, heartburn, or change in bowel habits  : NEGATIVE for frequency, dysuria, or hematuria  MUSCULOSKELETAL: NEGATIVE for significant arthralgias or myalgia  NEURO: NEGATIVE for weakness, dizziness or paresthesias  ENDOCRINE: NEGATIVE for temperature intolerance, skin/hair changes  HEME: NEGATIVE for bleeding problems  PSYCHIATRIC: NEGATIVE for changes in mood or affect    EXAM:   /72   Pulse 83   Temp 97.9  F (36.6  C) (Temporal)   Resp 16   Ht 1.816 m (5' 11.5\")   Wt 108.9 kg (240 lb)   SpO2 97%   BMI 33.01 kg/m      GENERAL APPEARANCE: healthy, alert and no distress     EYES: EOMI,  PERRL     HENT: ear canals and right TM normal. Left TM with large medial perforation. Nose and mouth without ulcers or lesions     NECK: no adenopathy, no asymmetry, masses, or scars and thyroid normal to palpation     RESP: lungs clear to auscultation - no rales, rhonchi or wheezes     CV: regular rates and rhythm, normal S1 S2, no S3 or S4 and no murmur, click or rub     ABDOMEN:  soft, nontender, no HSM or masses and bowel sounds normal     MS: extremities normal- no gross deformities noted, no evidence of inflammation in joints, FROM in all extremities.     SKIN: Multiple rubbery nodules over bilateral forearms and abdomen.      NEURO: Normal strength and tone, sensory exam " grossly normal, mentation intact and speech normal. Gait is stable.      PSYCH: mentation appears normal. and affect normal/bright     LYMPHATICS: No cervical adenopathy    DIAGNOSTICS:   EKG: Not indicated due to non-vascular surgery and low risk of event (age <65 and without cardiac risk factors)    Recent Labs   Lab Test 07/08/19  0953      POTASSIUM 4.1   CR 0.81     IMPRESSION:   Reason for surgery/procedure: multiple lipomas  Diagnosis/reason for consult: pre-operative clearance    The proposed surgical procedure is considered INTERMEDIATE risk.    REVISED CARDIAC RISK INDEX  The patient has the following serious cardiovascular risks for perioperative complications such as (MI, PE, VFib and 3  AV Block):  No serious cardiac risks  INTERPRETATION: 0 risks: Class I (very low risk - 0.4% complication rate)    The patient has the following additional risks for perioperative complications:  No identified additional risks      ICD-10-CM    1. Preop general physical exam Z01.818    2. Lipoma, unspecified site D17.9    3. CORY (obstructive sleep apnea) G47.33    4. Decreased hearing of left ear H91.92 OTOLARYNGOLOGY REFERRAL     AUDIOLOGY ADULT REFERRAL   5. Perforation of tympanic membrane, left H72.92 OTOLARYNGOLOGY REFERRAL       Cleared for surgery.    Still has perforation of left TM with decreased hearing so will send to ENT and audiology for further evaluation.    RECOMMENDATIONS:       --Patient is to take all scheduled medications on the day of surgery EXCEPT for modifications listed below.    Anticoagulant or Antiplatelet Medication Use  NSAIDS: Naproxen (Naprosyn):   Stop 2-3 days prior to surgery        APPROVAL GIVEN to proceed with proposed procedure, without further diagnostic evaluation       Signed Electronically by: Can Heard PA-C    Copy of this evaluation report is provided to requesting physician.    [facility] Preop Guidelines    Revised Cardiac Risk Index

## 2019-07-17 NOTE — PROGRESS NOTES
General Surgery Consultation    Walt Tompkins MRN# [id number]   Age: 33 year old YOB: 1985     Reason for consult: Multiple lipomas                        Assessment and Plan:   I was asked to see this patient at the request of FLOR Alba for evaluation of multiple lipomas.  Walt Tompkins is a 33 year old male who presented with history, exam, laboratory and imaging most consistent with:        ICD-10-CM    1. Abdominal lipoma D17.1 Ewa-Operative Worksheet   2. Lipoma of extremity D17.79 Ewa-Operative Worksheet   3. Lipoma of back D17.1 Ewa-Operative Worksheet       Patient has multiple lipomas through his upper extremities, back, and anterior abdomen.  Thus I recommend we do the back lipomas and a few on the upper extremities were it would be difficult with him being asleep - we can do these int he office while he is awake and as long as he can tolerates them.  The ones along his abdomen are smaller and deeper, thus I recommend we excised these in the OR while he's asleep along with the rest of the upper extremities lipomas that were not removed in the office.  I explained the risks, benefits, and altternatives; he showed understanding and wishes to proceed.      I thank FLOR Alba for the opportunity to participate in the patient's care.           Chief Complaint:   Multiple lipomas     History is obtained from the patient         History of Present Illness:   This patient is a 33 year old  male without a significant past medical history who presents with multiple lipomas throughout his body.  Had them for years; the ones on his upper extremities increasizing in size and number.  Some are causing him pain; thus he would like to remove them.  No hx of sarcomas; no hx of cancer.  He has multiple throughout his abdomen and a ~3cm in his back.  No issues with anestheisa.  No allergies.  He would like to watch some of his lipomas removed, especially the bigger ones on  his arms.            Past Medical History:    has no past medical history on file.          Past Surgical History:   No past surgical history on file.        Medications:     Current Outpatient Medications on File Prior to Visit:  allopurinol (ZYLOPRIM) 100 MG tablet Take 1 tablet (100 mg) by mouth daily   varenicline (CHANTIX CONTINUING MONTH PAK) 1 MG tablet Take 1 tablet (1 mg) by mouth 2 times daily Begin after the starter pack   varenicline (CHANTIX STARTING MONTH DEVAN) 0.5 MG X 11 & 1 MG X 42 tablet Take 0.5 mg tab daily for 3 days, THEN 0.5 mg tab twice daily for 4 days, THEN 1 mg twice daily.     No current facility-administered medications on file prior to visit.       Allergies:    No Known Allergies         Social History:   Walt Tompkins  reports that he quit smoking about 2 months ago. His smoking use included cigarettes. He has quit using smokeless tobacco. He reports that he drinks alcohol. He reports that he does not use drugs.          Family History:   The patient has no family history of any bleeding, clotting or anesthesia problems.          Review of Systems:     Constitutional: Denies fever or chills   Eyes: Denies change in visual acuity   HENT: Denies nasal congestion or sore throat   Respiratory: Denies cough or shortness of breath   Cardiovascular: Denies chest pain or edema   GI: Denies abdominal pain, nausea, vomiting, bloody stools or diarrhea   : Denies dysuria   Musculoskeletal: Denies back pain or joint pain   Integument: Denies rash   Neurologic: Denies headache, focal weakness or sensory changes   Endocrine: Denies polyuria or polydipsia   Lymphatic: Denies swollen glands   Psychiatric: Denies depression or anxiety          Physical Exam:     Constitutional: Awake, alert, no acute distress.  Eyes:  No scleral icterus.  Conjunctiva are without injection.  ENMT: Mucous membranes moist, dentition and gums are intact.   Neck: Soft, supple, trachea midline.    Endocrine: The  "thyroid is without masses and mobile with swallow.   Lymphatic: There is no cervical, submandibular adenopathy.  Respiratory: Lungs are clear to auscultation and percussion bilaterally.   Cardiovascular: Regular rate and rhythm. No murmurs, rubs, or gallops.    Abdomen: Non-distended, non-tender, normoactive bowel sounds present, No masses,   Musculoskeletal: No spinal or CVA tenderness. Full range of motion in the upper and lower extremities.    Skin: multipole lipomas throughout his body; 6 noted at LUE, 5 at RUE, 6 at bilateral sides of abdomen; 1 on the back.    Neurologic: Cranial nerves II through XII are grossly intact and symmetric.  Psychiatric: The patient is alert and oriented times 3.  The patient's affect is not blunted and mood is appropriate.          Data:   Vital Signs:  /74   Temp 97.2  F (36.2  C) (Temporal)   Ht 1.816 m (5' 11.5\")   Wt 106.9 kg (235 lb 12 oz)   BMI 32.42 kg/m       WBC - No results found for: WBC], HgB - No results found for: HGB]   Liver Function Studies - No results for input(s): PROTTOTAL, ALBUMIN, BILITOTAL, ALKPHOS, AST, ALT, BILIDIRECT in the last 90245 hours.  No results found for this or any previous visit (from the past 744 hour(s)).     [facility] DO 7/16/2019 10:52 PM           "

## 2019-07-23 ENCOUNTER — OFFICE VISIT (OUTPATIENT)
Dept: SURGERY | Facility: CLINIC | Age: 34
End: 2019-07-23
Payer: COMMERCIAL

## 2019-07-23 ENCOUNTER — APPOINTMENT (OUTPATIENT)
Dept: FAMILY MEDICINE | Facility: CLINIC | Age: 34
End: 2019-07-23
Payer: COMMERCIAL

## 2019-07-23 VITALS
DIASTOLIC BLOOD PRESSURE: 87 MMHG | SYSTOLIC BLOOD PRESSURE: 119 MMHG | HEART RATE: 69 BPM | WEIGHT: 235.75 LBS | BODY MASS INDEX: 32.42 KG/M2

## 2019-07-23 DIAGNOSIS — D17.20 LIPOMA OF EXTREMITY: Primary | ICD-10-CM

## 2019-07-23 DIAGNOSIS — D17.1 LIPOMA OF BACK: ICD-10-CM

## 2019-07-23 PROCEDURE — 24071 EXC ARM/ELBOW LES SC 3 CM/>: CPT | Mod: LT | Performed by: SURGERY

## 2019-07-23 PROCEDURE — 25071 EXC FOREARM LES SC 3 CM/>: CPT | Mod: LT | Performed by: SURGERY

## 2019-07-23 PROCEDURE — 25075 EXC FOREARM LES SC < 3 CM: CPT | Mod: 59 | Performed by: SURGERY

## 2019-07-23 PROCEDURE — 21931 EXC BACK LES SC 3 CM/>: CPT | Performed by: SURGERY

## 2019-07-23 PROCEDURE — 21930 EXC BACK LES SC < 3 CM: CPT | Mod: 59 | Performed by: SURGERY

## 2019-07-23 PROCEDURE — 88304 TISSUE EXAM BY PATHOLOGIST: CPT | Mod: TC | Performed by: SURGERY

## 2019-07-23 ASSESSMENT — PAIN SCALES - GENERAL: PAINLEVEL: NO PAIN (0)

## 2019-07-23 NOTE — LETTER
7/23/2019         RE: Walt Tompkins  [address] [facility] [address]        Dear Colleague,    Thank you for referring your patient, Walt Tompkins, to the [facility]. Please see a copy of my visit note below.    The History and Physical has been reviewed, the patient has been examined and no changes have occurred in the patient's condition since the H & P was completed.       We will excised as many posterior lesions as possible today; we will do the rest in the OR espeically the ones on his anterior abdominal wall.     RicardoValarieh Kaylene, DO         Again, thank you for allowing me to participate in the care of your patient.        Sincerely,        Nila Maurice MD

## 2019-07-27 NOTE — PROCEDURES
EXCISION PROCEDURE NOTE    [facility]: [[facility]]   : 1985, 33 year old Room/Bed: Room/bed info not found   CSN: [id number] Admission: No admission date for patient encounter.   MRN: [id number] Today: 2019,     PCP: Can Heard Attending: No att. providers found       PROCEDURE:   1. Excision of left upper extremity lipomas   A. Left distal biceps 3x2.2cm   B. Left medial forearm A 3.5x2.5cm   d. Left midline forearm 1.5x1.5cm   E. Left lateral forearm 0.5x1cm   F. Left inferior forearm 1.8x1.8cm   C. Left medial forearm B 1.5x1cm  2. Excision right upper extremity lipoma   H. Right forearm 2.5x2.5cm  3. Excision of lower back lipomas   G. Left medial back 1x1cm   F. Right medial back 2x1cm   J. Right lateral back 4x3cm    INDICATION: multiple symptomatic lipomas    PRE-PROCEDURE     : Nila Maurice MD  CONSENT: This procedure has been fully reviewed with the patient and written informed consent has been obtained.      PROCEDURE     The left upper extremity area was prepped and appropriately anesthetized with 1% lidocaine with epinephrine at multiple palpation masses. Utilizing a #15 blade scalpel, an incision was made over the area of the palpable mass.  Adsons forceps were used to elevate the skin edge and blunt dissection utilizing a curved hemostat was done to separate the, from the deep dermis and the surrounding subcutaneous fat .  The lipoma was able to be completely excise once blunt dissection was done and the lipoma was freely mobile.   This was done through all separate incisions utilizing the same technique as labeled as above.  Hemostasis was achieved by holding pressure and injecting more local anesthetic with epinephrine, copious irrigation was then used to clean the wound. Hemostasis was achieved.  The size of the lipomas of the left upper extremity were measured and labeled as above.  Intermediate closure was accomplished with interrupted 3-0  moncryl for the deep subcutaneous layer and running subcuticular 4-0 monocryl for the skin.  Incisions werecovered with dermabond.        The right upper extremity area was prepped and appropriately anesthetized with 1% lidocaine with epinephrine at one palpation mass that was most symptomatic.   Utilizing a #15 blade scalpel, an incision was made over the area of the palpable mass.  Adsons forceps were used to elevate the skin edge and blunt dissection utilizing a curved hemostat was done to separate the, from the deep dermis and the surrounding subcutaneous fat .  The lipoma was able to be completely excise once blunt dissection was done and the lipoma was freely mobile.  Hemostasis was achieved by holding pressure and injecting more local anesthetic with epinephrine, copious irrigation was then used to clean the wound. Hemostasis was achieved.  The size of the lipoma of theright upper extremity was measured and labeled as above.  Intermediate closure was accomplished with interrupted 3-0 moncryl for the deep subcutaneous layer and running subcuticular 4-0 monocryl for the skin.  Incision was covered with dermabond.    Then laid the patient in a prone position .  The lower back area was prepped and appropriately anesthetized with 1% lidocaine with epinephrine of the 3 other palpable masses across the back.  Utilizing a #15 blade scalpel, an incision was made over the area of the palpable mass.  Adsons forceps were used to elevate the skin edge and blunt dissection utilizing a curved hemostat was done to separate the lipoma from the deep dermis and the surround subcutaneous fat. Hemostasis was achieved by holding pressure and injecting more local anesthetic with epinephrine, copious irrigation was then used to clean the wound. Hemostasis was achieved.  This was done in a similar fashion to the other 2 palpable lipomas.  All were done through separate incisions.  Patient has 3 separate incisions on his back total.   The size of the lipomas of the entire back were measured and labeled as above.  Intermediate closure was accomplished with interrupted 3-0 Monocryl for the deep subcutaneous layer and running subcuticular 4-0 Monocryl for the skin.  Incision was covered with Dermabond.  The procedure was well tolerated and without complications. All Specimen were sent to Pathology    POST-PROCEDURE     The patient tolerated the procedure well.  Patient understand we were not able to remove all the desired lipomas as we have reached the max amount of local anesthesia for one day.  We will prasanna out the rest during his OR trip.      COMPLICATIONS: none    Plan:  1. Instructed to keep the wound dry and covered for 24-48h and clean thereafter.  2. Warning signs of infection were reviewed.    3. Recommended that the patient use OTC acetaminophen, OTC ibuprofen as needed for pain.         Electronically signed by: Nila Maurice MD; 7/27/2019

## 2019-07-29 LAB — COPATH REPORT: NORMAL

## 2019-07-31 ENCOUNTER — OFFICE VISIT (OUTPATIENT)
Dept: FAMILY MEDICINE | Facility: OTHER | Age: 34
End: 2019-07-31
Payer: COMMERCIAL

## 2019-07-31 VITALS
SYSTOLIC BLOOD PRESSURE: 110 MMHG | WEIGHT: 240 LBS | HEIGHT: 72 IN | TEMPERATURE: 97.9 F | OXYGEN SATURATION: 97 % | RESPIRATION RATE: 16 BRPM | BODY MASS INDEX: 32.51 KG/M2 | HEART RATE: 83 BPM | DIASTOLIC BLOOD PRESSURE: 72 MMHG

## 2019-07-31 DIAGNOSIS — H72.92 PERFORATION OF TYMPANIC MEMBRANE, LEFT: ICD-10-CM

## 2019-07-31 DIAGNOSIS — G47.33 OSA (OBSTRUCTIVE SLEEP APNEA): ICD-10-CM

## 2019-07-31 DIAGNOSIS — D17.9 LIPOMA, UNSPECIFIED SITE: ICD-10-CM

## 2019-07-31 DIAGNOSIS — Z01.818 PREOP GENERAL PHYSICAL EXAM: Primary | ICD-10-CM

## 2019-07-31 DIAGNOSIS — H91.92 DECREASED HEARING OF LEFT EAR: ICD-10-CM

## 2019-07-31 PROCEDURE — 99214 OFFICE O/P EST MOD 30 MIN: CPT | Performed by: PHYSICIAN ASSISTANT

## 2019-07-31 ASSESSMENT — MIFFLIN-ST. JEOR: SCORE: 2073.69

## 2019-08-05 ENCOUNTER — ANESTHESIA EVENT (OUTPATIENT)
Dept: SURGERY | Facility: CLINIC | Age: 34
End: 2019-08-05
Payer: COMMERCIAL

## 2019-08-05 ENCOUNTER — HOSPITAL ENCOUNTER (OUTPATIENT)
Facility: CLINIC | Age: 34
Discharge: HOME | End: 2019-08-05
Attending: SURGERY | Admitting: SURGERY
Payer: COMMERCIAL

## 2019-08-05 ENCOUNTER — ANESTHESIA (OUTPATIENT)
Dept: SURGERY | Facility: CLINIC | Age: 34
End: 2019-08-05
Payer: COMMERCIAL

## 2019-08-05 VITALS
HEIGHT: 72 IN | DIASTOLIC BLOOD PRESSURE: 82 MMHG | TEMPERATURE: 97.5 F | WEIGHT: 240 LBS | SYSTOLIC BLOOD PRESSURE: 120 MMHG | HEART RATE: 73 BPM | OXYGEN SATURATION: 97 % | BODY MASS INDEX: 32.51 KG/M2 | RESPIRATION RATE: 11 BRPM

## 2019-08-05 PROCEDURE — 37000008 ZZH ANESTHESIA TECHNICAL FEE, 1ST 30 MIN: Performed by: SURGERY

## 2019-08-05 PROCEDURE — 25800030 ZZH RX IP 258 OP 636: Performed by: NURSE ANESTHETIST, CERTIFIED REGISTERED

## 2019-08-05 PROCEDURE — 22903 EXC ABD LES SC 3 CM/>: CPT | Performed by: SURGERY

## 2019-08-05 PROCEDURE — 36000052 ZZH SURGERY LEVEL 2 EA 15 ADDTL MIN: Performed by: SURGERY

## 2019-08-05 PROCEDURE — 27327 EXC THIGH/KNEE LES SC < 3 CM: CPT | Mod: LT | Performed by: SURGERY

## 2019-08-05 PROCEDURE — 88304 TISSUE EXAM BY PATHOLOGIST: CPT | Mod: 26 | Performed by: SURGERY

## 2019-08-05 PROCEDURE — 88304 TISSUE EXAM BY PATHOLOGIST: CPT | Performed by: SURGERY

## 2019-08-05 PROCEDURE — 25000125 ZZHC RX 250: Performed by: NURSE ANESTHETIST, CERTIFIED REGISTERED

## 2019-08-05 PROCEDURE — 25000125 ZZHC RX 250: Performed by: SURGERY

## 2019-08-05 PROCEDURE — 24075 EXC ARM/ELBOW LES SC < 3 CM: CPT | Mod: RT | Performed by: SURGERY

## 2019-08-05 PROCEDURE — 25000128 H RX IP 250 OP 636: Performed by: NURSE ANESTHETIST, CERTIFIED REGISTERED

## 2019-08-05 PROCEDURE — 40000306 ZZH STATISTIC PRE PROC ASSESS II: Performed by: SURGERY

## 2019-08-05 PROCEDURE — 25000128 H RX IP 250 OP 636: Performed by: SURGERY

## 2019-08-05 PROCEDURE — 27210794 ZZH OR GENERAL SUPPLY STERILE: Performed by: SURGERY

## 2019-08-05 PROCEDURE — 25075 EXC FOREARM LES SC < 3 CM: CPT | Mod: LT | Performed by: SURGERY

## 2019-08-05 PROCEDURE — 25000132 ZZH RX MED GY IP 250 OP 250 PS 637: Performed by: SURGERY

## 2019-08-05 PROCEDURE — 71000014 ZZH RECOVERY PHASE 1 LEVEL 2 FIRST HR: Performed by: SURGERY

## 2019-08-05 PROCEDURE — 37000009 ZZH ANESTHESIA TECHNICAL FEE, EACH ADDTL 15 MIN: Performed by: SURGERY

## 2019-08-05 PROCEDURE — 36000050 ZZH SURGERY LEVEL 2 1ST 30 MIN: Performed by: SURGERY

## 2019-08-05 PROCEDURE — 22902 EXC ABD LES SC < 3 CM: CPT | Mod: 59 | Performed by: SURGERY

## 2019-08-05 PROCEDURE — 25000566 ZZH SEVOFLURANE, EA 15 MIN: Performed by: SURGERY

## 2019-08-05 PROCEDURE — 71000027 ZZH RECOVERY PHASE 2 EACH 15 MINS: Performed by: SURGERY

## 2019-08-05 RX ORDER — MEPERIDINE HYDROCHLORIDE 25 MG/ML
12.5 INJECTION INTRAMUSCULAR; INTRAVENOUS; SUBCUTANEOUS
Status: DISCONTINUED | OUTPATIENT
Start: 2019-08-05 | End: 2019-08-05 | Stop reason: HOSPADM

## 2019-08-05 RX ORDER — SODIUM CHLORIDE, SODIUM LACTATE, POTASSIUM CHLORIDE, CALCIUM CHLORIDE 600; 310; 30; 20 MG/100ML; MG/100ML; MG/100ML; MG/100ML
INJECTION, SOLUTION INTRAVENOUS CONTINUOUS
Status: DISCONTINUED | OUTPATIENT
Start: 2019-08-05 | End: 2019-08-05 | Stop reason: HOSPADM

## 2019-08-05 RX ORDER — LIDOCAINE HYDROCHLORIDE 20 MG/ML
INJECTION, SOLUTION INFILTRATION; PERINEURAL PRN
Status: DISCONTINUED | OUTPATIENT
Start: 2019-08-05 | End: 2019-08-05

## 2019-08-05 RX ORDER — PROPOFOL 10 MG/ML
INJECTION, EMULSION INTRAVENOUS PRN
Status: DISCONTINUED | OUTPATIENT
Start: 2019-08-05 | End: 2019-08-05

## 2019-08-05 RX ORDER — ONDANSETRON 2 MG/ML
INJECTION INTRAMUSCULAR; INTRAVENOUS PRN
Status: DISCONTINUED | OUTPATIENT
Start: 2019-08-05 | End: 2019-08-05

## 2019-08-05 RX ORDER — ONDANSETRON 2 MG/ML
4 INJECTION INTRAMUSCULAR; INTRAVENOUS EVERY 30 MIN PRN
Status: DISCONTINUED | OUTPATIENT
Start: 2019-08-05 | End: 2019-08-05 | Stop reason: HOSPADM

## 2019-08-05 RX ORDER — NALOXONE HYDROCHLORIDE 0.4 MG/ML
.1-.4 INJECTION, SOLUTION INTRAMUSCULAR; INTRAVENOUS; SUBCUTANEOUS
Status: DISCONTINUED | OUTPATIENT
Start: 2019-08-05 | End: 2019-08-05 | Stop reason: HOSPADM

## 2019-08-05 RX ORDER — DEXAMETHASONE SODIUM PHOSPHATE 10 MG/ML
INJECTION, SOLUTION INTRAMUSCULAR; INTRAVENOUS PRN
Status: DISCONTINUED | OUTPATIENT
Start: 2019-08-05 | End: 2019-08-05

## 2019-08-05 RX ORDER — HYDROMORPHONE HYDROCHLORIDE 1 MG/ML
.3-.5 INJECTION, SOLUTION INTRAMUSCULAR; INTRAVENOUS; SUBCUTANEOUS EVERY 10 MIN PRN
Status: DISCONTINUED | OUTPATIENT
Start: 2019-08-05 | End: 2019-08-05 | Stop reason: HOSPADM

## 2019-08-05 RX ORDER — DEXAMETHASONE SODIUM PHOSPHATE 10 MG/ML
4 INJECTION INTRAMUSCULAR; INTRAVENOUS EVERY 10 MIN PRN
Status: DISCONTINUED | OUTPATIENT
Start: 2019-08-05 | End: 2019-08-05 | Stop reason: HOSPADM

## 2019-08-05 RX ORDER — CEFAZOLIN SODIUM 1 G/3ML
1 INJECTION, POWDER, FOR SOLUTION INTRAMUSCULAR; INTRAVENOUS SEE ADMIN INSTRUCTIONS
Status: DISCONTINUED | OUTPATIENT
Start: 2019-08-05 | End: 2019-08-05 | Stop reason: HOSPADM

## 2019-08-05 RX ORDER — ALBUTEROL SULFATE 0.83 MG/ML
2.5 SOLUTION RESPIRATORY (INHALATION) EVERY 4 HOURS PRN
Status: DISCONTINUED | OUTPATIENT
Start: 2019-08-05 | End: 2019-08-05 | Stop reason: HOSPADM

## 2019-08-05 RX ORDER — FENTANYL CITRATE 50 UG/ML
25-50 INJECTION, SOLUTION INTRAMUSCULAR; INTRAVENOUS
Status: DISCONTINUED | OUTPATIENT
Start: 2019-08-05 | End: 2019-08-05 | Stop reason: HOSPADM

## 2019-08-05 RX ORDER — HYDRALAZINE HYDROCHLORIDE 20 MG/ML
2.5-5 INJECTION INTRAMUSCULAR; INTRAVENOUS EVERY 10 MIN PRN
Status: DISCONTINUED | OUTPATIENT
Start: 2019-08-05 | End: 2019-08-05 | Stop reason: HOSPADM

## 2019-08-05 RX ORDER — KETOROLAC TROMETHAMINE 30 MG/ML
INJECTION, SOLUTION INTRAMUSCULAR; INTRAVENOUS PRN
Status: DISCONTINUED | OUTPATIENT
Start: 2019-08-05 | End: 2019-08-05

## 2019-08-05 RX ORDER — FENTANYL CITRATE 50 UG/ML
INJECTION, SOLUTION INTRAMUSCULAR; INTRAVENOUS PRN
Status: DISCONTINUED | OUTPATIENT
Start: 2019-08-05 | End: 2019-08-05

## 2019-08-05 RX ORDER — ONDANSETRON 4 MG/1
4 TABLET, ORALLY DISINTEGRATING ORAL EVERY 30 MIN PRN
Status: DISCONTINUED | OUTPATIENT
Start: 2019-08-05 | End: 2019-08-05 | Stop reason: HOSPADM

## 2019-08-05 RX ORDER — BUPIVACAINE HYDROCHLORIDE AND EPINEPHRINE 2.5; 5 MG/ML; UG/ML
INJECTION, SOLUTION INFILTRATION; PERINEURAL PRN
Status: DISCONTINUED | OUTPATIENT
Start: 2019-08-05 | End: 2019-08-05 | Stop reason: HOSPADM

## 2019-08-05 RX ORDER — ACETAMINOPHEN 325 MG/1
975 TABLET ORAL ONCE
Status: COMPLETED | OUTPATIENT
Start: 2019-08-05 | End: 2019-08-05

## 2019-08-05 RX ORDER — CEFAZOLIN SODIUM 2 G/100ML
2 INJECTION, SOLUTION INTRAVENOUS
Status: COMPLETED | OUTPATIENT
Start: 2019-08-05 | End: 2019-08-05

## 2019-08-05 RX ADMIN — CEFAZOLIN SODIUM 2 G: 2 INJECTION, SOLUTION INTRAVENOUS at 13:02

## 2019-08-05 RX ADMIN — SODIUM CHLORIDE, POTASSIUM CHLORIDE, SODIUM LACTATE AND CALCIUM CHLORIDE: 600; 310; 30; 20 INJECTION, SOLUTION INTRAVENOUS at 12:54

## 2019-08-05 RX ADMIN — KETOROLAC TROMETHAMINE 30 MG: 30 INJECTION, SOLUTION INTRAMUSCULAR at 14:07

## 2019-08-05 RX ADMIN — ACETAMINOPHEN 975 MG: 325 TABLET ORAL at 11:58

## 2019-08-05 RX ADMIN — DEXAMETHASONE SODIUM PHOSPHATE 10 MG: 10 INJECTION, SOLUTION INTRAMUSCULAR; INTRAVENOUS at 13:16

## 2019-08-05 RX ADMIN — MIDAZOLAM 2 MG: 1 INJECTION INTRAMUSCULAR; INTRAVENOUS at 12:56

## 2019-08-05 RX ADMIN — PROPOFOL 200 MG: 10 INJECTION, EMULSION INTRAVENOUS at 13:02

## 2019-08-05 RX ADMIN — LIDOCAINE HYDROCHLORIDE 60 MG: 20 INJECTION, SOLUTION INFILTRATION; PERINEURAL at 13:02

## 2019-08-05 RX ADMIN — HYDROMORPHONE HYDROCHLORIDE 0.5 MG: 1 INJECTION, SOLUTION INTRAMUSCULAR; INTRAVENOUS; SUBCUTANEOUS at 13:17

## 2019-08-05 RX ADMIN — SODIUM CHLORIDE, POTASSIUM CHLORIDE, SODIUM LACTATE AND CALCIUM CHLORIDE: 600; 310; 30; 20 INJECTION, SOLUTION INTRAVENOUS at 14:00

## 2019-08-05 RX ADMIN — SODIUM CHLORIDE, POTASSIUM CHLORIDE, SODIUM LACTATE AND CALCIUM CHLORIDE: 600; 310; 30; 20 INJECTION, SOLUTION INTRAVENOUS at 12:19

## 2019-08-05 RX ADMIN — FENTANYL CITRATE 100 MCG: 50 INJECTION, SOLUTION INTRAMUSCULAR; INTRAVENOUS at 12:56

## 2019-08-05 RX ADMIN — ONDANSETRON 4 MG: 2 INJECTION INTRAMUSCULAR; INTRAVENOUS at 13:16

## 2019-08-05 RX ADMIN — LIDOCAINE HYDROCHLORIDE 1 ML: 10 INJECTION, SOLUTION EPIDURAL; INFILTRATION; INTRACAUDAL; PERINEURAL at 12:19

## 2019-08-05 ASSESSMENT — MIFFLIN-ST. JEOR: SCORE: 2073.69

## 2019-08-05 ASSESSMENT — LIFESTYLE VARIABLES: TOBACCO_USE: 1

## 2019-08-05 NOTE — ANESTHESIA POSTPROCEDURE EVALUATION
Patient: Walt Tompkins    Procedure(s):  Excision of multiple lipomas - bilateral upper extremities and abdomen    Diagnosis:multiple lipomas  Diagnosis Additional Information: No value filed.    Anesthesia Type:  General, ETT    Note:  Anesthesia Post Evaluation    Patient location during evaluation: Phase 2  Patient participation: Able to fully participate in evaluation  Level of consciousness: awake  Pain management: adequate  Airway patency: patent  Cardiovascular status: acceptable and hemodynamically stable  Respiratory status: acceptable, room air and nonlabored ventilation  Hydration status: stable  PONV: none     Anesthetic complications: None    Comments: Patient was happy with the anesthesia care received and no anesthesia related complications were noted.  I will follow up with the patient again if it is needed.        Last vitals:  Vitals:    08/05/19 1440 08/05/19 1445 08/05/19 1515   BP: (!) 144/86 133/86    Pulse: 96 85    Resp: 12 11    Temp:      SpO2: 95% 96% 96%         Electronically Signed By: SANDRO Negrete CRNA  August 5, 2019  3:27 PM

## 2019-08-05 NOTE — ANESTHESIA CARE TRANSFER NOTE
Patient: Walt Tompkins    Procedure(s):  Excision of multiple lipomas - bilateral upper extremities and abdomen    Diagnosis: multiple lipomas  Diagnosis Additional Information: No value filed.    Anesthesia Type:   General, ETT     Note:  Airway :Face Mask  Patient transferred to:Phase II  Handoff Report: Identifed the Patient, Identified the Reponsible Provider, Reviewed the pertinent medical history, Discussed the surgical course, Reviewed Intra-OP anesthesia mangement and issues during anesthesia, Set expectations for post-procedure period and Allowed opportunity for questions and acknowledgement of understanding      Vitals: (Last set prior to Anesthesia Care Transfer)    CRNA VITALS  8/5/2019 1401 - 8/5/2019 1435      8/5/2019             Pulse:  82    SpO2:  91 %                Electronically Signed By: SANDRO Negrete CRNA  August 5, 2019  2:35 PM

## 2019-08-05 NOTE — INTERVAL H&P NOTE
The History and Physical has been reviewed, the patient has been examined and no changes have occurred in the patient's condition since the H & P was completed.       [facility], DO

## 2019-08-05 NOTE — ANESTHESIA PREPROCEDURE EVALUATION
"Anesthesia Pre-Procedure Evaluation    Patient: Walt Tompkins   MRN: [id number] : 1985          Preoperative Diagnosis: multiple lipomas    Procedure(s):  Excision of multiple lipomas - bilateral upper extremities and abdomen    No past medical history on file.  No past surgical history on file.    Anesthesia Evaluation     .             ROS/MED HX    ENT/Pulmonary:     (+)sleep apnea, tobacco use, Current use , . .    Neurologic:  - neg neurologic ROS     Cardiovascular:  - neg cardiovascular ROS       METS/Exercise Tolerance:     Hematologic:  - neg hematologic  ROS       Musculoskeletal:  - neg musculoskeletal ROS       GI/Hepatic:  - neg GI/hepatic ROS       Renal/Genitourinary:  - ROS Renal section negative       Endo:     (+) Obesity, .      Psychiatric:  - neg psychiatric ROS       Infectious Disease:  - neg infectious disease ROS       Malignancy:      - no malignancy   Other:    - neg other ROS                      Physical Exam  Normal systems: cardiovascular, pulmonary and dental    Airway   Mallampati: II  TM distance: >3 FB  Neck ROM: full    Dental     Cardiovascular   Rhythm and rate: regular and normal      Pulmonary    breath sounds clear to auscultation            Lab Results   Component Value Date     2019    POTASSIUM 4.1 2019    CHLORIDE 106 2019    CO2 26 2019    BUN 15 2019    CR 0.81 2019    GLC 95 2019    ASHLY 9.4 2019       Preop Vitals  BP Readings from Last 3 Encounters:   19 110/72   19 119/87   07/15/19 126/74    Pulse Readings from Last 3 Encounters:   19 83   19 69   19 78      Resp Readings from Last 3 Encounters:   19 16   19 16   05/15/19 16    SpO2 Readings from Last 3 Encounters:   19 97%   19 98%   05/15/19 99%      Temp Readings from Last 1 Encounters:   19 97.9  F (36.6  C) (Temporal)    Ht Readings from Last 1 Encounters:   19 1.816 m (5' 11.5\") " "     Wt Readings from Last 1 Encounters:   07/31/19 108.9 kg (240 lb)    Estimated body mass index is 33.01 kg/m  as calculated from the following:    Height as of 7/31/19: 1.816 m (5' 11.5\").    Weight as of 7/31/19: 108.9 kg (240 lb).       Anesthesia Plan      History & Physical Review  History and physical reviewed and following examination; no interval change.    ASA Status:  2 .    NPO Status:  > 6 hours    Plan for General and ETT with Intravenous and Propofol induction. Maintenance will be TIVA.    PONV prophylaxis:  Ondansetron (or other 5HT-3)       Postoperative Care      Consents  Anesthetic plan, risks, benefits and alternatives discussed with:  Patient.  Use of blood products discussed: No .   .                 SANDRO Negrete CRNA  "

## 2019-08-05 NOTE — DISCHARGE INSTRUCTIONS
Wait until 8pm to take any NSAIDS (Aleve, Ibuprofen, Advil) as you received a medication like it through your IV at the end of your surgery.        [facility]   Adult Discharge Orders & Instructions     For 24 hours after surgery    1. Get plenty of rest.  A responsible adult must stay with you for at least 24 hours after you leave the hospital.   2. Do not drive or use heavy equipment.  If you have weakness or tingling, don't drive or use heavy equipment until this feeling goes away.  3. Do not drink alcohol.  4. Avoid strenuous or risky activities.  Ask for help when climbing stairs.   5. You may feel lightheaded.  If so, sit for a few minutes before standing.  Have someone help you get up.   6. You may have a slight fever. Call the doctor if your fever is over 100 F (37.7 C) (taken under the tongue) or lasts longer than 24 hours.  7. You may have a dry mouth, a sore throat, muscle aches or trouble sleeping.  These should go away after 24 hours.  8. Do not make important or legal decisions.  We don t expect you to have any problems from the surgery or treatment you had today. Just in case, here s what to do if you have pain, upset stomach (nausea), bleeding or infection:  Pain:  Take medicines your doctor has prescribed or over-the-counter medicine they have suggested. Resting and using ice packs can help, too. For surgery on an arm or leg, raise it on a pillow to ease swelling. Call your doctor if these methods don t work.  Copyright Isael Walters, Licensed under [employer]  Upset stomach (nausea):  Take anti-nausea medicine approved by your doctor. Drink clear liquids like apple juice, ginger ale, broth or 7-Up. Be sure to drink enough fluids. Rest can help, too. Move to normal foods when you re ready. Bleeding:  In the first 24 hours, you may see a little blood on your dressing, about the size of a quarter. You don t need to worry about this much blood, but if the blood spot  keeps getting bigger:    Put pressure on the wound if you can, AND    Call your doctor.  Copyright [employer], Licensed under [employer]  Fever/Infection: Please call your doctor if you have any of these signs:    Redness    Swelling    Wound feels warm    Pain gets worse    Bad-smelling fluid leaks from wound    Fever or chills  Call your doctor for any of the followin.  It has been over 8 to 10 hours since surgery and you are still not able to urinate (pass water).    2.  Headache for over 24 hours.    Nurse advice line: [phone]

## 2019-08-05 NOTE — OP NOTE
EXCISION PROCEDURE NOTE    [facility]: [unfilled]   : 1985, 33 year old Room/Bed: PRE-OP/PHASE II Pool Iris*   CSN: [id number] Admission: 2019 11:40 AM   MRN: [id number] Today: 2019, Hospital Day: 1   PCP: Can Heard Attending: Nila Maurice MD       PROCEDURE:   1. Excision of of multiple lipomas of B/L Upper extremities, left lower thigh, and anterior abdomen      SPECIMEN    ID Type Source Tests Collected by Time Destination   A : Right Distal Arm Mass  1.2x0.9x0.4cm Tissue Arm, Right SURGICAL PATHOLOGY EXAM Nila Maurice MD 2019  1:29 PM    B : Right Distal Arm Mass  0.8x0.7x0.4cm Tissue Arm, Right SURGICAL PATHOLOGY EXAM Nila Maurice MD 2019  1:30 PM    C : Right Distal Arm Mass  0.6x0.9x0.5cm Tissue Arm, Right SURGICAL PATHOLOGY EXAM Nila Maurice MD 2019  1:31 PM    D : Right Distal Arm Mass  1x1.3x0.9cm Tissue Arm, Right SURGICAL PATHOLOGY EXAM Nila Maurice MD 2019  1:31 PM    E : Right Antecubital Arm Mass  2.3x1.4x1cm Tissue Arm, Right SURGICAL PATHOLOGY EXAM Nila Maurice MD 2019  1:32 PM    F : Right Upper Arm Mass 1.6x1.3x1cm Tissue Arm, Right SURGICAL PATHOLOGY EXAM Nila Maurice MD 2019  1:33 PM    G : Left Distal Arm Mass  0.9x1.1x0.4cm Tissue Arm, Left SURGICAL PATHOLOGY EXAM Nila Maurice MD 2019  1:39 PM    H : Left Distal Arm Mass  1.4x1.5x0.6cm Tissue Arm, Left SURGICAL PATHOLOGY EXAM Nila Maurice MD 2019  1:40 PM    I : Left distal arm mass  0.9x0.7x0.3cm Tissue Arm, Left SURGICAL PATHOLOGY EXAM Nila Maurice MD 2019  1:40 PM    J : Distal Left Arm Mass  2.1x1.5x0.6cm Tissue Arm, Left SURGICAL PATHOLOGY EXAM Nila Maurice MD 2019  1:41 PM    K : Distal Left Arm Mass  1.2x0.8x0.4cm Tissue Arm, Left SURGICAL PATHOLOGY EXAM Nila Maurice MD 2019  1:42 PM    L : Left Upper Arm Mass  1.8x1.4x0.9cm Tissue Arm, Left SURGICAL PATHOLOGY EXAM Nila Maurice MD 2019  1:42 PM    M : Left  Superior Abdomen Mass  2x1.2x1.2cm Tissue Abdomen SURGICAL PATHOLOGY EXAM Nila Maurice MD 8/5/2019  1:54 PM    N : Left Middle Abdomen Mass  4x2.6x2cm Tissue Abdomen SURGICAL PATHOLOGY EXAM Nila Maurice MD 8/5/2019  1:55 PM    O : Left Inferior Abdomen Mass  2.0w6e0ry Tissue Abdomen SURGICAL PATHOLOGY EXAM Nila Maurice MD 8/5/2019  1:56 PM    P : Left Thigh Mass  2.5x1.5x1.1cm Tissue Leg, left SURGICAL PATHOLOGY EXAM Nila Maurice MD 8/5/2019  2:07 PM    Q : Right Abdomen Mass  4.3x3x1.8 Tissue Abdomen SURGICAL PATHOLOGY EXAM Nila Maurice MD 8/5/2019  2:09 PM        INDICATION: multiple painful lipomas    PRE-PROCEDURE     : Nila Maurice MD  CONSENT: This procedure has been fully reviewed with the patient and written informed consent has been obtained.    PROCEDURE     The left upper extremity area was prepped and appropriately anesthetized with 1% lidocaine mix with 0.25%marcaine with epinephrine at multiple palpation masses. Utilizing a #15 blade scalpel, an incision was made over the area of the palpable mass.  Adsons forceps were used to elevate the skin edge and blunt dissection utilizing a curved hemostat was done to separate the, from the deep dermis and the surrounding subcutaneous fat .  The lipoma was able to be completely excise once blunt dissection was done and the lipoma was freely mobile.   This was done through all separate incisions utilizing the same technique as labeled as above.  Hemostasis was achieved by holding pressure and injecting more local anesthetic with epinephrine, copious irrigation was then used to clean the wound. Hemostasis was achieved.  The size of the lipomas of the left upper extremity were measured and labeled as above.  Intermediate closure was accomplished with interrupted 3-0 moncryl for the deep subcutaneous layer and running subcuticular 4-0 monocryl for the skin.  Incisions werecovered with dermabond.        The right upper extremity area was prepped  and appropriately anesthetized with 1% lidocaine mixed with 0.25% marcaine with epinephrine at one palpation mass that was most symptomatic.   Utilizing a #15 blade scalpel, an incision was made over the area of the palpable mass.  Adsons forceps were used to elevate the skin edge and blunt dissection utilizing a curved hemostat was done to separate the, from the deep dermis and the surrounding subcutaneous fat .  The lipoma was able to be completely excise once blunt dissection was done and the lipoma was freely mobile.  Hemostasis was achieved by holding pressure and injecting more local anesthetic with epinephrine, copious irrigation was then used to clean the wound. Hemostasis was achieved.  The size of the lipomas of the right upper extremity were measured and labeled as above.  Intermediate closure was accomplished with interrupted 3-0 moncryl for the deep subcutaneous layer and running subcuticular 4-0 monocryl for the skin.  Incision was covered with dermabond.     I then focused on the anterior abdomen .  The abdomen area was prepped and appropriately anesthetized with 1% lidocaine mixed 0.25% marcaine with epinephrine over the areas of the palpable lipomas.  Utilizing a #15 blade scalpel, an incision was made over the area of the palpable mass.  Adsons forceps were used to elevate the skin edge and blunt dissection utilizing a curved hemostat was done to separate the lipoma from the deep dermis and the surround subcutaneous fat. Hemostasis was achieved by holding pressure and injecting more local anesthetic with epinephrine, copious irrigation was then used to clean the wound. Hemostasis was achieved.  This was done in a similar fashion to the other palpable lipomas.  All were done through separate incisions.  Patient has 4 separate incisions total on his abdomen.  The size of the lipomas were measured and labeled as above.  Intermediate closure was accomplished with interrupted 3-0 Monocryl for the deep  subcutaneous layer and running subcuticular 4-0 Monocryl for the skin.  Incision was covered with Dermabond.  The procedure was well tolerated and without complications.    Left thigh lesion was then prepped and draped.  This was anesthetized just like all the other incisions.  This was previously marked.  Utilizing a #15 blade scalpel an incision was then made Iafrate the lipomas from the deep dermis and the surrounding subcutaneous fat.  Was able to completely excise a lipoma with light pressure.  This was measured as stated above.  Hemostasis was achieved.  And it was closed with interrupted 3-0 Monocryl followed by a 4-0 subcuticular running Monocryl.  The incision was then covered with Dermabond.      All Specimen were sent to Pathology in separate containers.      POST-PROCEDURE     The patient tolerated the procedure well    COMPLICATIONS: none    Plan:  1. Instructed to keep the wound dry and covered for 24-48h and clean thereafter.  2. Warning signs of infection were reviewed.    3. Recommended that the patient use OTC acetaminophen, OTC ibuprofen as needed for pain.         Electronically signed by: Nila Maurice MD; 8/5/2019

## 2019-08-07 LAB — COPATH REPORT: NORMAL

## 2019-08-07 NOTE — OR NURSING
[facility] Same Day Surgery  Discharge Call Back  Walt Tompkins  1985  MRN: [id number]  Home: [phone] (home)   PCP: Can Heard    We are calling to see how you're doing since your surgery/procedure with us?   Comments: well   Clinical Questions  1. Have you had time to look at your discharge instructions? Do you have any questions in regards to the instructions?   Comment: yes no  2. Do you feel your pain is being controlled with the regimen the surgeon sent you home on? (ie: prescription medications, over the counter pain medications, ice packs)   Comments: yes  3. Have you noticed any drainage on your dressing? Do you know what to do if you have bleeding as a result of your procedure?   Comments: no yes   4. Have you had any nausea/vomiting? Do you know how to treat this?   Comment: no yes  5. Have you had any signs/symptoms of infection? (ie: fever, swelling, heat, drainage or redness) Do you know what to do if you have?   Comment: no yes  6. Do you have a follow up appointment made with your surgeon? Do you have a number to contact them at if you need it?   Comment: yes  Retained Foreign Object (DILIP, Hemovac, Penrose, Wound Packing, Vaginal Packing, Nasal Splints, Urethral Stents, Mckay Catheter)  1. Do you still have absorbable suture in place?   2. If the item is still in place, can you review the plan for removal with me? NA      You may be randomly selected to fill out a [facility] survey. We would appreciate you taking the time to fill this out. It is important to us if you would answer all of the questions on the survey.

## 2019-10-04 ENCOUNTER — TELEPHONE (OUTPATIENT)
Dept: SURGERY | Facility: CLINIC | Age: 34
End: 2019-10-04

## 2019-10-04 ENCOUNTER — NURSE TRIAGE (OUTPATIENT)
Dept: NURSING | Facility: CLINIC | Age: 34
End: 2019-10-04

## 2019-10-04 NOTE — TELEPHONE ENCOUNTER
Dr Maurice, patient called. Assured insurance would cover the procedure from 8-5-19. He received a $8,000.00 bill. Is there something you missed? Please call the patient to discuss next steps with cost.  Marilee Beck RN-[facility]

## 2019-10-04 NOTE — TELEPHONE ENCOUNTER
Dr Maurice, patient called. Assured insurance would cover the procedure. He received a $8,000.00 bill. Is there something you missed? Please call the patient to discuss next steps with cost.  Epic encounter sent to Dr Maurice.  Marilee Beck RN-[facility]

## 2019-10-10 ENCOUNTER — TELEPHONE (OUTPATIENT)
Dept: FAMILY MEDICINE | Facility: OTHER | Age: 34
End: 2019-10-10

## 2019-10-10 ENCOUNTER — E-VISIT (OUTPATIENT)
Dept: FAMILY MEDICINE | Facility: OTHER | Age: 34
End: 2019-10-10
Payer: COMMERCIAL

## 2019-10-10 DIAGNOSIS — M10.9 GOUT INVOLVING TOE, UNSPECIFIED CAUSE, UNSPECIFIED CHRONICITY, UNSPECIFIED LATERALITY: Primary | ICD-10-CM

## 2019-10-10 PROCEDURE — 99444 ZZC PHYSICIAN ONLINE EVALUATION & MANAGEMENT SERVICE: CPT | Performed by: PHYSICIAN ASSISTANT

## 2019-10-10 RX ORDER — COLCHICINE 0.6 MG/1
0.6 TABLET ORAL DAILY
Qty: 12 TABLET | Refills: 0 | Status: SHIPPED | OUTPATIENT
Start: 2019-10-10 | End: 2020-08-06

## 2019-10-10 NOTE — TELEPHONE ENCOUNTER
Patient called back. Billing number provided to patient via voicemail per patient's request.    Alessandra Park RN on 10/10/2019 at 12:23 PM

## 2019-10-10 NOTE — TELEPHONE ENCOUNTER
Reason for call:  Medication   If this is a refill request, has the caller requested the refill from the pharmacy already? Yes  Will the patient be using a [facility]? No  Name of the pharmacy and phone number for the current request: [facility]  [phone] fax [phone]    Name of the medication requested: colchicine     Other request: Patient is experiencing a flare up with his gout. Would like the medication as soon as possible    Phone number to reach patient:  Cell number on file:    Telephone Information:   Mobile [phone]       Best Time:  anytime    Can we leave a detailed message on this number?  YES

## 2019-10-10 NOTE — TELEPHONE ENCOUNTER
Per management request, writer calling patient to provide him with the billing phone number ([phone]) and discuss any issues.  Alessandra Park RN on 10/10/2019 at 12:15 PM

## 2019-10-10 NOTE — TELEPHONE ENCOUNTER
Spoke to patient and he would rather not have to come in, he does have the allopurinol  But just not touching  it.. He said he had  a pork sandwhich  Last night.  He said that colchicine just works faster if you would be okay prescribing this.  Pharmacy  Pended if appropriate

## 2019-10-10 NOTE — TELEPHONE ENCOUNTER
Called and spoke with patient regarding message below.  Patient verbalized understanding and will start an evisit through my chart today.  Sylvie Gunderson CMA ([facility])

## 2019-10-10 NOTE — TELEPHONE ENCOUNTER
We have never prescribed this. Please assist in scheduling a visit.     Freida Stoddard, RN, BSN

## 2020-03-02 ENCOUNTER — HEALTH MAINTENANCE LETTER (OUTPATIENT)
Age: 35
End: 2020-03-02

## 2020-08-05 ENCOUNTER — MYC REFILL (OUTPATIENT)
Dept: FAMILY MEDICINE | Facility: OTHER | Age: 35
End: 2020-08-05

## 2020-08-05 DIAGNOSIS — M10.9 GOUT INVOLVING TOE, UNSPECIFIED CAUSE, UNSPECIFIED CHRONICITY, UNSPECIFIED LATERALITY: ICD-10-CM

## 2020-08-05 RX ORDER — ALLOPURINOL 100 MG/1
100 TABLET ORAL DAILY
Qty: 30 TABLET | Refills: 5 | Status: CANCELLED | OUTPATIENT
Start: 2020-08-05

## 2020-08-05 RX ORDER — COLCHICINE 0.6 MG/1
0.6 TABLET ORAL DAILY
Qty: 12 TABLET | Refills: 0 | Status: CANCELLED | OUTPATIENT
Start: 2020-08-05

## 2020-08-06 ENCOUNTER — VIRTUAL VISIT (OUTPATIENT)
Dept: FAMILY MEDICINE | Facility: OTHER | Age: 35
End: 2020-08-06
Payer: COMMERCIAL

## 2020-08-06 DIAGNOSIS — M10.9 GOUT INVOLVING TOE, UNSPECIFIED CAUSE, UNSPECIFIED CHRONICITY, UNSPECIFIED LATERALITY: ICD-10-CM

## 2020-08-06 PROCEDURE — 99213 OFFICE O/P EST LOW 20 MIN: CPT | Mod: 95 | Performed by: PHYSICIAN ASSISTANT

## 2020-08-06 RX ORDER — COLCHICINE 0.6 MG/1
0.6 TABLET ORAL DAILY
Qty: 12 TABLET | Refills: 1 | Status: SHIPPED | OUTPATIENT
Start: 2020-08-06 | End: 2021-03-22

## 2020-08-06 NOTE — TELEPHONE ENCOUNTER
Pending Prescriptions:                       Disp   Refills    allopurinol (ZYLOPRIM) 100 MG tablet      30 tab*5            Sig: Take 1 tablet (100 mg) by mouth daily  Sent 7/8/19 with 6 month supply; A break in medication.      colchicine (COLCYRS) 0.6 MG tablet        12 tab*0            Sig: Take 1 tablet (0.6 mg) by mouth daily Take 2           tablets now then 1 tablet 1 hour later. Continue           with 1 tablet daily until gout pain is gone.  Sent 10/10/19 with 12 tablet, 0 refill supply.    Due for preventative care visit and labs: CBC, ALT, uric acid, creatinine    Routed to registration.  Liberty Ellis, BSN, RN, PHN

## 2020-08-06 NOTE — TELEPHONE ENCOUNTER
Called patient and left a message. Patient is due for preventative care visit and labs per note below. Thank you

## 2020-08-06 NOTE — PROGRESS NOTES
"Walt Tompkins is a 34 year old male who is being evaluated via a billable telephone visit.      The patient has been notified of following:     \"This telephone visit will be conducted via a call between you and your physician/provider. We have found that certain health care needs can be provided without the need for a physical exam.  This service lets us provide the care you need with a short phone conversation.  If a prescription is necessary we can send it directly to your pharmacy.  If lab work is needed we can place an order for that and you can then stop by our lab to have the test done at a later time.    Telephone visits are billed at different rates depending on your insurance coverage. During this emergency period, for some insurers they may be billed the same as an in-person visit.  Please reach out to your insurance provider with any questions.    If during the course of the call the physician/provider feels a telephone visit is not appropriate, you will not be charged for this service.\"    Patient has given verbal consent for Telephone visit?  Yes    What phone number would you like to be contacted at? [phone]    How would you like to obtain your AVS? Esau Sinha     Watl Tompkins is a 34 year old male who presents via phone visit today for the following health issues:    HPI    Medication Followup colchicine    Taking Medication as prescribed: yes    Side Effects:  None    Medication Helping Symptoms:  yes     He does not experience gout flares very frequently but when he does, he takes colchicine which helps take away the symptoms. He was previously prescribed allopurinol for prevention but does not take this very often because he does not have frequent flares. It is usually only if he drinks a really hoppy beer or eats something he know she shouldn't. He does not feel daily allopurinol is necessary. He has a mild flare in his left great toe currently.    Reviewed and updated " as needed this visit by Provider  Allergies  Meds  Problems  Med Hx    Review of Systems   GENERAL: Denies fever, fatigue, weakness, weight gain, or weight loss.  CARDIOVASCULAR: Denies chest pain, shortness of breath, irregular heartbeats, palpitations, or edema.  RESPIRATORY: Denies cough, hemoptysis, and shortness of breath.  MUSCULOSKELETAL: +Left foot/great toe pain. Denies muscle weakness.       Objective   Reported vitals:  There were no vitals taken for this visit.   General: alert and no distress over the phone  PSYCH: Alert and oriented times 3; coherent speech, normal   rate and volume, able to articulate logical thoughts, able   to abstract reason, no tangential thoughts, no hallucinations   or delusions. His affect is normal  RESP: No cough, no audible wheezing, able to talk in full sentences  Remainder of exam unable to be completed due to telephone visits      Assessment/Plan:    ICD-10-CM    1. Gout involving toe, unspecified cause, unspecified chronicity, unspecified laterality  M10.9 colchicine (COLCYRS) 0.6 MG tablet       Current gout flare which only occurs a few times per year. Will refill colchicine to take as needed for flares. He does not want to refill the allopurinol as he has not been taking it and does not feel like he needs it to prevent gout. He will watch his diet and if symptoms start to occur more frequently and he would like to restart allopurinol, he will contact the clinic.    I congratulated him on being tobacco free for a year.       Phone call duration:  5 minutes    Can Heard PA-C

## 2020-08-10 ENCOUNTER — E-VISIT (OUTPATIENT)
Dept: FAMILY MEDICINE | Facility: OTHER | Age: 35
End: 2020-08-10
Payer: COMMERCIAL

## 2020-08-10 DIAGNOSIS — M10.9 GOUT INVOLVING TOE, UNSPECIFIED CAUSE, UNSPECIFIED CHRONICITY, UNSPECIFIED LATERALITY: Primary | ICD-10-CM

## 2020-08-10 PROCEDURE — 99421 OL DIG E/M SVC 5-10 MIN: CPT | Performed by: PHYSICIAN ASSISTANT

## 2020-08-10 RX ORDER — PREDNISONE 20 MG/1
TABLET ORAL
Qty: 20 TABLET | Refills: 0 | Status: SHIPPED | OUTPATIENT
Start: 2020-08-10 | End: 2021-03-22

## 2020-08-17 DIAGNOSIS — M10.9 GOUT INVOLVING TOE, UNSPECIFIED CAUSE, UNSPECIFIED CHRONICITY, UNSPECIFIED LATERALITY: ICD-10-CM

## 2020-08-18 RX ORDER — ALLOPURINOL 100 MG/1
100 TABLET ORAL DAILY
Qty: 30 TABLET | Refills: 5 | OUTPATIENT
Start: 2020-08-18

## 2020-08-18 NOTE — TELEPHONE ENCOUNTER
Chart reviewed, recent visit patient stated he didn't want to take allopurinol.  Electronically signed by Pamela Johnson MD on 8/18/2020

## 2020-08-18 NOTE — TELEPHONE ENCOUNTER
Pending Prescriptions:                       Disp   Refills    allopurinol (ZYLOPRIM) 100 MG tablet       30 tab*5        Sig: Take 1 tablet (100 mg) by mouth daily      Routing refill request to provider for review/approval because:  Drug not active on patient's medication list

## 2020-09-22 ENCOUNTER — AMBULATORY - HEALTHEAST (OUTPATIENT)
Dept: FAMILY MEDICINE | Facility: CLINIC | Age: 35
End: 2020-09-22

## 2020-09-22 ENCOUNTER — VIRTUAL VISIT (OUTPATIENT)
Dept: FAMILY MEDICINE | Facility: OTHER | Age: 35
End: 2020-09-22

## 2020-09-22 DIAGNOSIS — Z20.822 SUSPECTED COVID-19 VIRUS INFECTION: ICD-10-CM

## 2020-09-22 NOTE — PROGRESS NOTES
"Date: 2020 12:21:44  Clinician: Eric Remy  Clinician NPI: [id number]  Patient: Walt Tompkins  Patient : 1985  Patient Address: [address], [place] [address]  Patient Phone: [phone]  Visit Protocol: URI  Patient Summary:  Walt is a 35 year old ( : 1985 ) male who initiated a [facility] Visit for COVID-19 (Coronavirus) evaluation and screening. When asked the question \"Please sign me up to receive news, health information and promotions. \", Walt responded \"No\".    When asked when his symptoms started, Walt reported that he does not have any symptoms.   He denies taking antibiotic medication in the past month and having recent facial or sinus surgery in the past 60 days.    Pertinent COVID-19 (Coronavirus) information  In the past 14 days, Walt has not worked in a congregate living setting.   He does not work or volunteer as healthcare worker or a  and does not work or volunteer in a healthcare facility.   Walt also has not lived in a congregate living setting in the past 14 days. He lives with a healthcare worker.   Walt has had a close contact with a laboratory-confirmed COVID-19 patient in the last 14 days. Additional information about contact with COVID-19 (Coronavirus) patient as reported by the patient (free text): I had dinner with my sister on the  to celebrate her birthday, on the  she became symptomatic. She was tested for COVID-19 on the  and a positive result returned to her on the . During our encounter we spent several hours together in close proximity.   Patient reported they are not living in the same household with a COVID-19 positive patient.  Patient was in an enclosed space for greater than 15 minutes with a COVID-19 patient.  Since 2019, Walt and has not had upper respiratory infection or influenza-like illness. Has not been diagnosed with lab-confirmed COVID-19 test  "  Pertinent medical history  Walt needs a return to work/school note.   Weight: 237 lbs   Walt does not smoke or use smokeless tobacco.   Weight: 237 lbs    MEDICATIONS: No current medications, ALLERGIES: NKDA  Clinician Response:  Dear Walt,   Based on your exposure to COVID-19 (coronavirus), we would like to test you for this virus.  1. Please call [phone] to schedule your visit. Explain that you were referred by [facility] to have a COVID-19 test. Be ready to share your [facility] visit ID number.  The following will serve as your written order for this COVID Test, ordered by me, for the indication of suspected COVID [Z20.828]: The test will be ordered in [employer], our electronic health record, after you are scheduled. It will show as ordered and authorized by Jose Griffiths MD.  Order: COVID-19 (coronavirus) PCR for ASYMPTOMATIC EXPOSURE testing from [facility].  If you know you have had close contact with someone who tested positive, you should be quarantined for 14 days after this exposure. You should stay in quarantine for the14 days even if the covid test is negative, the optimal time to test after exposure is 5-7 days from the exposure  Quarantine means   What should I do?  For safety, it's very important to follow these rules. Do this for 14 days after the date you were last exposed to the virus..  Stay home and away from others. Don't go to school or anywhere else. Generally quarantine means staying home from work but there are some exceptions to this. Please contact your workplace.   No hugging, kissing or shaking hands.  Don't let anyone visit.  Cover your mouth and nose with a mask, tissue or washcloth to avoid spreading germs.  Wash your hands and face often. Use soap and water.  What are the symptoms of COVID-19?  The most common symptoms are cough, fever and trouble breathing. Less common symptoms include headache, body aches, fatigue (feeling very tired), chills, sore throat, stuffy or runny  nose, diarrhea (loose poop), loss of taste or smell, belly pain, and nausea or vomiting (feeling sick to your stomach or throwing up).  After 14 days, if you have still don't have symptoms, you likely don't have this virus.  If you develop symptoms, follow these guidelines.  If you're normally healthy: Please start another [facility] visit to report your symptoms. Go to [email or web address].  If you have a serious health problem (like cancer, heart failure, an organ transplant or kidney disease): Call your specialty clinic. Let them know that you might have COVID-19.  2. When it's time for your COVID test:  Stay at least 6 feet away from others. (If someone will drive you to your test, stay in the backseat, as far away from the  as you can.)  Cover your mouth and nose with a mask, tissue or washcloth.  Go straight to the testing site. Don't make any stops on the way there or back.  Please note  Caregivers in these groups are at risk for severe illness due to COVID-19:  o People 65 years and older  o People who live in a nursing home or long-term care facility  o People with chronic disease (lung, heart, cancer, diabetes, kidney, liver, immunologic)  o People who have a weakened immune system, including those who:  Are in cancer treatment  Take medicine that weakens the immune system, such as corticosteroids  Had a bone marrow or organ transplant  Have an immune deficiency  Have poorly controlled HIV or AIDS  Are obese (body mass index of 40 or higher)  Smoke regularly  Where can I get more information?   [facility] -- About COVID-19: [email or web address]  CDC -- What to Do If You're Sick: www.cdc.gov/coronavirus/2019-ncov/about/steps-when-sick.html  CDC -- Ending Home Isolation: www.cdc.gov/coronavirus/2019-ncov/hcp/disposition-in-home-patients.html  CDC -- Caring for Someone: www.cdc.gov/coronavirus/2019-ncov/if-you-are-sick/care-for-someone.html  [facility] -- Interim Guidance for Hospital Discharge to Home:  www.health.[facility].[place].us/diseases/coronavirus/hcp/hospdischarge.pdf  [facility] clinical trials (COVID-19 research studies): clinicalaffairs.[email or web address]/umn-clinical-trials  Below are the COVID-19 hotlines at the [facility] ([facility]). Interpreters are available.  For health questions: Call 141-089-8783 or 1-811.154.9123 (7 a.m. to 7 p.m.)  For questions about schools and childcare: Call 558-115-5910 or 1-600.732.5402 (7 a.m. to 7 p.m.)    Diagnosis: Contact with and (suspected) exposure to other viral communicable diseases  Diagnosis ICD: Z20.828

## 2020-09-23 ENCOUNTER — AMBULATORY - HEALTHEAST (OUTPATIENT)
Dept: FAMILY MEDICINE | Facility: CLINIC | Age: 35
End: 2020-09-23

## 2020-09-23 DIAGNOSIS — Z20.822 SUSPECTED COVID-19 VIRUS INFECTION: ICD-10-CM

## 2020-09-24 ENCOUNTER — COMMUNICATION - HEALTHEAST (OUTPATIENT)
Dept: SCHEDULING | Facility: CLINIC | Age: 35
End: 2020-09-24

## 2020-12-20 ENCOUNTER — HEALTH MAINTENANCE LETTER (OUTPATIENT)
Age: 35
End: 2020-12-20

## 2021-03-22 ENCOUNTER — VIRTUAL VISIT (OUTPATIENT)
Dept: FAMILY MEDICINE | Facility: OTHER | Age: 36
End: 2021-03-22
Payer: COMMERCIAL

## 2021-03-22 ENCOUNTER — MYC REFILL (OUTPATIENT)
Dept: FAMILY MEDICINE | Facility: OTHER | Age: 36
End: 2021-03-22

## 2021-03-22 DIAGNOSIS — M10.9 GOUT INVOLVING TOE, UNSPECIFIED CAUSE, UNSPECIFIED CHRONICITY, UNSPECIFIED LATERALITY: ICD-10-CM

## 2021-03-22 PROCEDURE — 99213 OFFICE O/P EST LOW 20 MIN: CPT | Mod: TEL | Performed by: PHYSICIAN ASSISTANT

## 2021-03-22 RX ORDER — PREDNISONE 20 MG/1
TABLET ORAL
Qty: 11 TABLET | Refills: 0 | Status: SHIPPED | OUTPATIENT
Start: 2021-03-22 | End: 2021-12-01

## 2021-03-22 RX ORDER — PREDNISONE 20 MG/1
TABLET ORAL
Qty: 20 TABLET | Refills: 0 | OUTPATIENT
Start: 2021-03-22

## 2021-03-22 NOTE — PROGRESS NOTES
Walt is a 35 year old who is being evaluated via a billable video visit.      How would you like to obtain your AVS? MyChart  If the video visit is dropped, the invitation should be resent by: Text to cell phone: [phone]  Will anyone else be joining your video visit? No    Video Start Time: 1:05 pm    Assessment & Plan       ICD-10-CM    1. Gout involving toe, unspecified cause, unspecified chronicity, unspecified laterality  M10.9 predniSONE (DELTASONE) 20 MG tablet       Recurrent gout.  Will treat with prednisone to take as directed.  We discussed a gout diet and I mentioned trying allopurinol but he would like to avoid a daily medication unless it starts to occur more frequently.  Contact the clinic if not improving.     MELVA Macario [facility]   Walt is a 35 year old who presents for the following health issues     History of Present Illness       He eats 2-3 servings of fruits and vegetables daily.He consumes 0 sweetened beverage(s) daily.He exercises with enough effort to increase his heart rate 20 to 29 minutes per day.  He exercises with enough effort to increase his heart rate 3 or less days per week.   He is taking medications regularly.       Gout/ Single Inflamed Joint  Onset/Duration: flare up started 2 days ago  Description:   Location: toes - left  Joint Swelling: no  Redness: no  Pain: YES  Intensity: mild  Progression of Symptoms: worsening and same  Accompanying Signs & Symptoms:  Fevers: no  History:   Trauma to the area: no  Previous history of gout: YES  Recent illness: no  Alcohol use: YES- but tries not to  Diuretic use: no  Precipitating or alleviating factors: being up and moving along with drinking water makes it worse but is worse in the morning   Therapies tried and outcome: pushing water    History of recurrent gout as it usually migrates between his feet and different toes. He currently has redness and pain in the left  4th and 5th toe over the MTP joints. Partially controlled with good hydration and Tylenol but pain returns in the mornings the past few days. Prednisone has worked well for him in the past.      Review of Systems   Constitutional, cardiovascular, pulmonary, and musculoskeletal systems are negative, except as otherwise noted.      Objective       Vitals:  No vitals were obtained today due to virtual visit.    Physical Exam   GENERAL: Healthy, alert and no distress  EYES: Eyes grossly normal to inspection.  No discharge or erythema, or obvious scleral/conjunctival abnormalities.  RESP: No audible wheeze, cough, or visible cyanosis.  No visible retractions or increased work of breathing.    SKIN: Visible skin clear. No significant rash, abnormal pigmentation or lesions.  NEURO: Cranial nerves grossly intact.  Mentation and speech appropriate for age.  PSYCH: Mentation appears normal, affect normal/bright, judgement and insight intact, normal speech and appearance well-groomed.        Video-Visit Details    Type of service:  Video Visit    Video End Time: 1:11 pm    Originating Location (pt. Location): Home    Distant Location (provider location):  [facility]     Platform used for Video Visit: [employer]onic

## 2021-04-18 ENCOUNTER — HEALTH MAINTENANCE LETTER (OUTPATIENT)
Age: 36
End: 2021-04-18

## 2021-04-27 ENCOUNTER — IMMUNIZATION (OUTPATIENT)
Dept: NURSING | Facility: CLINIC | Age: 36
End: 2021-04-27
Payer: COMMERCIAL

## 2021-04-27 PROCEDURE — 0001A PR COVID VAC PFIZER DIL RECON 30 MCG/0.3 ML IM: CPT

## 2021-04-27 PROCEDURE — 91300 PR COVID VAC PFIZER DIL RECON 30 MCG/0.3 ML IM: CPT

## 2021-05-18 ENCOUNTER — IMMUNIZATION (OUTPATIENT)
Dept: NURSING | Facility: CLINIC | Age: 36
End: 2021-05-18
Attending: INTERNAL MEDICINE
Payer: COMMERCIAL

## 2021-05-18 PROCEDURE — 0002A PR COVID VAC PFIZER DIL RECON 30 MCG/0.3 ML IM: CPT

## 2021-05-18 PROCEDURE — 91300 PR COVID VAC PFIZER DIL RECON 30 MCG/0.3 ML IM: CPT

## 2021-10-03 ENCOUNTER — HEALTH MAINTENANCE LETTER (OUTPATIENT)
Age: 36
End: 2021-10-03

## 2021-11-29 ASSESSMENT — ENCOUNTER SYMPTOMS
PARESTHESIAS: 0
DYSURIA: 0
COUGH: 0
WEAKNESS: 0
HEMATOCHEZIA: 0
HEADACHES: 0
HEARTBURN: 0
HEMATURIA: 0
CONSTIPATION: 0
NAUSEA: 0
ARTHRALGIAS: 0
SHORTNESS OF BREATH: 0
ABDOMINAL PAIN: 0
CHILLS: 0
NERVOUS/ANXIOUS: 0
DIZZINESS: 0
MYALGIAS: 0
FEVER: 0
SORE THROAT: 0
JOINT SWELLING: 0
FREQUENCY: 0
EYE PAIN: 0
PALPITATIONS: 0

## 2021-12-01 ENCOUNTER — OFFICE VISIT (OUTPATIENT)
Dept: FAMILY MEDICINE | Facility: CLINIC | Age: 36
End: 2021-12-01
Payer: COMMERCIAL

## 2021-12-01 VITALS
DIASTOLIC BLOOD PRESSURE: 86 MMHG | HEIGHT: 72 IN | BODY MASS INDEX: 33.18 KG/M2 | SYSTOLIC BLOOD PRESSURE: 130 MMHG | RESPIRATION RATE: 18 BRPM | HEART RATE: 70 BPM | WEIGHT: 245 LBS | TEMPERATURE: 97.6 F

## 2021-12-01 DIAGNOSIS — Z00.00 ROUTINE GENERAL MEDICAL EXAMINATION AT A HEALTH CARE FACILITY: Primary | ICD-10-CM

## 2021-12-01 DIAGNOSIS — Z23 COVID-19 VACCINE ADMINISTERED: ICD-10-CM

## 2021-12-01 DIAGNOSIS — Z23 INFLUENZA VACCINE ADMINISTERED: ICD-10-CM

## 2021-12-01 DIAGNOSIS — Z23 HIGH PRIORITY FOR 2019-NCOV VACCINE: ICD-10-CM

## 2021-12-01 DIAGNOSIS — R19.7 DIARRHEA, UNSPECIFIED TYPE: ICD-10-CM

## 2021-12-01 DIAGNOSIS — Z11.4 ENCOUNTER FOR SCREENING FOR HIV: ICD-10-CM

## 2021-12-01 LAB
ALBUMIN SERPL-MCNC: 4.3 G/DL (ref 3.4–5)
ALP SERPL-CCNC: 63 U/L (ref 40–150)
ALT SERPL W P-5'-P-CCNC: 75 U/L (ref 0–70)
ANION GAP SERPL CALCULATED.3IONS-SCNC: 7 MMOL/L (ref 3–14)
AST SERPL W P-5'-P-CCNC: 33 U/L (ref 0–45)
BILIRUB SERPL-MCNC: 1 MG/DL (ref 0.2–1.3)
BUN SERPL-MCNC: 16 MG/DL (ref 7–30)
CALCIUM SERPL-MCNC: 9.6 MG/DL (ref 8.5–10.1)
CHLORIDE BLD-SCNC: 106 MMOL/L (ref 94–109)
CO2 SERPL-SCNC: 25 MMOL/L (ref 20–32)
CREAT SERPL-MCNC: 0.85 MG/DL (ref 0.66–1.25)
CRP SERPL-MCNC: <2.9 MG/L (ref 0–8)
ERYTHROCYTE [DISTWIDTH] IN BLOOD BY AUTOMATED COUNT: 12.4 % (ref 10–15)
ERYTHROCYTE [SEDIMENTATION RATE] IN BLOOD BY WESTERGREN METHOD: 5 MM/HR (ref 0–15)
GFR SERPL CREATININE-BSD FRML MDRD: >90 ML/MIN/1.73M2
GLUCOSE BLD-MCNC: 90 MG/DL (ref 70–99)
HCT VFR BLD AUTO: 47.7 % (ref 40–53)
HGB BLD-MCNC: 16.9 G/DL (ref 13.3–17.7)
HIV 1+2 AB+HIV1 P24 AG SERPL QL IA: NONREACTIVE
MCH RBC QN AUTO: 31.2 PG (ref 26.5–33)
MCHC RBC AUTO-ENTMCNC: 35.4 G/DL (ref 31.5–36.5)
MCV RBC AUTO: 88 FL (ref 78–100)
PLATELET # BLD AUTO: 209 10E3/UL (ref 150–450)
POTASSIUM BLD-SCNC: 4.1 MMOL/L (ref 3.4–5.3)
PROT SERPL-MCNC: 8.6 G/DL (ref 6.8–8.8)
RBC # BLD AUTO: 5.42 10E6/UL (ref 4.4–5.9)
SODIUM SERPL-SCNC: 138 MMOL/L (ref 133–144)
TSH SERPL DL<=0.005 MIU/L-ACNC: 2.73 MU/L (ref 0.4–4)
WBC # BLD AUTO: 5.8 10E3/UL (ref 4–11)

## 2021-12-01 PROCEDURE — 99395 PREV VISIT EST AGE 18-39: CPT | Mod: 25 | Performed by: FAMILY MEDICINE

## 2021-12-01 PROCEDURE — 85027 COMPLETE CBC AUTOMATED: CPT | Performed by: FAMILY MEDICINE

## 2021-12-01 PROCEDURE — 90686 IIV4 VACC NO PRSV 0.5 ML IM: CPT | Performed by: FAMILY MEDICINE

## 2021-12-01 PROCEDURE — 84443 ASSAY THYROID STIM HORMONE: CPT | Performed by: FAMILY MEDICINE

## 2021-12-01 PROCEDURE — 85652 RBC SED RATE AUTOMATED: CPT | Performed by: FAMILY MEDICINE

## 2021-12-01 PROCEDURE — 99213 OFFICE O/P EST LOW 20 MIN: CPT | Mod: 25 | Performed by: FAMILY MEDICINE

## 2021-12-01 PROCEDURE — 36415 COLL VENOUS BLD VENIPUNCTURE: CPT | Performed by: FAMILY MEDICINE

## 2021-12-01 PROCEDURE — 0004A COVID-19,PF,PFIZER (12+ YRS): CPT | Performed by: FAMILY MEDICINE

## 2021-12-01 PROCEDURE — 80053 COMPREHEN METABOLIC PANEL: CPT | Performed by: FAMILY MEDICINE

## 2021-12-01 PROCEDURE — 91300 COVID-19,PF,PFIZER (12+ YRS): CPT | Performed by: FAMILY MEDICINE

## 2021-12-01 PROCEDURE — 90471 IMMUNIZATION ADMIN: CPT | Performed by: FAMILY MEDICINE

## 2021-12-01 PROCEDURE — 83516 IMMUNOASSAY NONANTIBODY: CPT | Performed by: FAMILY MEDICINE

## 2021-12-01 PROCEDURE — 86140 C-REACTIVE PROTEIN: CPT | Performed by: FAMILY MEDICINE

## 2021-12-01 PROCEDURE — 87389 HIV-1 AG W/HIV-1&-2 AB AG IA: CPT | Performed by: FAMILY MEDICINE

## 2021-12-01 PROCEDURE — 82784 ASSAY IGA/IGD/IGG/IGM EACH: CPT | Performed by: FAMILY MEDICINE

## 2021-12-01 ASSESSMENT — ENCOUNTER SYMPTOMS
HEMATOCHEZIA: 0
HEMATURIA: 0
COUGH: 0
ABDOMINAL PAIN: 0
DIARRHEA: 1
HEARTBURN: 0
SHORTNESS OF BREATH: 0
HEADACHES: 0
SORE THROAT: 0
EYE PAIN: 0
PARESTHESIAS: 0
WEAKNESS: 0
MYALGIAS: 0
PALPITATIONS: 0
CONSTIPATION: 0
DYSURIA: 0
NERVOUS/ANXIOUS: 0
ARTHRALGIAS: 0
NAUSEA: 0
FREQUENCY: 0
DIZZINESS: 0
JOINT SWELLING: 0
FEVER: 0
CHILLS: 0

## 2021-12-01 ASSESSMENT — MIFFLIN-ST. JEOR: SCORE: 2081.37

## 2021-12-01 ASSESSMENT — PAIN SCALES - GENERAL: PAINLEVEL: NO PAIN (0)

## 2021-12-01 NOTE — PROGRESS NOTES
SUBJECTIVE:   CC: Walt Tompkins is an 36 year old male who presents for preventative health visit.     Patient has been advised of split billing requirements and indicates understanding: Yes  Healthy Habits:     Getting at least 3 servings of Calcium per day:  Yes    Bi-annual eye exam:  NO    Dental care twice a year:  Yes    Sleep apnea or symptoms of sleep apnea:  Sleep apnea    Diet:  Regular (no restrictions)    Frequency of exercise:  2-3 days/week    Duration of exercise:  15-30 minutes    Taking medications regularly:  Yes    Medication side effects:  Not applicable    PHQ-2 Total Score: 1    Additional concerns today:  No      PROBLEMS TO ADD ON...  Digestive issues- can't seem to digest anything he eats. Any processed foods cause a lot of pain.  Experiencing for about 1 year or so.  No blood in the stool.  Appetite normal.      Today's PHQ-2 Score:   PHQ-2 (  [employer]) 2021   Q1: Little interest or pleasure in doing things 0   Q2: Feeling down, depressed or hopeless 1   PHQ-2 Score 1   PHQ-2 Total Score (12-17 Years)- Positive if 3 or more points; Administer PHQ-A if positive -   Q1: Little interest or pleasure in doing things Not at all   Q2: Feeling down, depressed or hopeless Several days   PHQ-2 Score 1       Abuse: Current or Past(Physical, Sexual or Emotional)- No  Do you feel safe in your environment? Yes    Have you ever done Advance Care Planning? (For example, a Health Directive, POLST, or a discussion with a medical provider or your loved ones about your wishes): No, advance care planning information given to patient to review.  Patient declined advance care planning discussion at this time.    Social History     Tobacco Use     Smoking status: Former Smoker     Packs/day: 0.00     Years: 0.00     Pack years: 0.00     Types: Cigarettes     Quit date: 5/15/2019     Years since quittin.5     Smokeless tobacco: Former User     Tobacco comment: E cig, used to chew   Substance Use  Topics     Alcohol use: Yes     Comment: once per week         Alcohol Use 2021   Prescreen: >3 drinks/day or >7 drinks/week? Yes   AUDIT SCORE  5       Last PSA: No results found for: PSA    Reviewed orders with patient. Reviewed health maintenance and updated orders accordingly - Yes  Lab work is in process  Labs reviewed in EPIC  BP Readings from Last 3 Encounters:   21 130/86   19 120/82   19 110/72    Wt Readings from Last 3 Encounters:   21 111.1 kg (245 lb)   19 108.9 kg (240 lb)   19 108.9 kg (240 lb)                  Patient Active Problem List   Diagnosis     Class 1 obesity due to excess calories without serious comorbidity with body mass index (BMI) of 33.0 to 33.9 in adult     Lipoma, unspecified site     Gout involving toe, unspecified cause, unspecified chronicity, unspecified laterality     CORY (obstructive sleep apnea)     Past Surgical History:   Procedure Laterality Date     EXCISE MASS TRUNK N/A 2019    Procedure: Excision of multiple lipomas - bilateral upper extremities and abdomen;  Surgeon: Nila Maurice MD;  Location: [facility]     ORTHOPEDIC SURGERY      Broken wrist       Social History     Tobacco Use     Smoking status: Former Smoker     Packs/day: 0.00     Years: 0.00     Pack years: 0.00     Types: Cigarettes     Quit date: 5/15/2019     Years since quittin.5     Smokeless tobacco: Former User     Tobacco comment: E cig, used to chew   Substance Use Topics     Alcohol use: Yes     Comment: once per week     No family history on file.      No current outpatient medications on file.     No Known Allergies  Recent Labs   Lab Test 19  0953   LDL 97   HDL 40   TRIG 240*   CR 0.81   GFRESTIMATED >90   GFRESTBLACK >90   POTASSIUM 4.1        Reviewed and updated as needed this visit by clinical staff  Tobacco  Allergies  Meds             Reviewed and updated as needed this visit by Provider                 Review of Systems  "  Constitutional: Negative for chills and fever.   HENT: Negative for congestion, ear pain, hearing loss and sore throat.    Eyes: Negative for pain and visual disturbance.   Respiratory: Negative for cough and shortness of breath.    Cardiovascular: Negative for chest pain, palpitations and peripheral edema.   Gastrointestinal: Positive for diarrhea. Negative for abdominal pain, constipation, heartburn, hematochezia and nausea.   Genitourinary: Negative for dysuria, frequency, genital sores, hematuria, impotence, penile discharge and urgency.   Musculoskeletal: Negative for arthralgias, joint swelling and myalgias.   Skin: Negative for rash.   Neurological: Negative for dizziness, weakness, headaches and paresthesias.   Psychiatric/Behavioral: Negative for mood changes. The patient is not nervous/anxious.        OBJECTIVE:   /86 (Cuff Size: Adult Large)   Pulse 70   Temp 97.6  F (36.4  C) (Tympanic)   Resp 18   Ht 1.816 m (5' 11.5\")   Wt 111.1 kg (245 lb)   BMI 33.69 kg/m      Physical Exam  GENERAL: alert, no distress and obese  EYES: Eyes grossly normal to inspection, PERRL and conjunctivae and sclerae normal  HENT: normal cephalic/atraumatic, nose and mouth without ulcers or lesions, oropharynx clear and oral mucous membranes moist  NECK: no adenopathy, no asymmetry, masses, or scars and thyroid normal to palpation  RESP: lungs clear to auscultation - no rales, rhonchi or wheezes  CV: regular rate and rhythm, normal S1 S2, no S3 or S4, no murmur, click or rub, no peripheral edema and peripheral pulses strong  ABDOMEN: soft, nontender, no hepatosplenomegaly, no masses and bowel sounds normal  MS: no gross musculoskeletal defects noted, no edema  SKIN: no suspicious lesions or rashes  NEURO: Normal strength and tone, mentation intact and speech normal  PSYCH: mentation appears normal, affect normal/bright      ASSESSMENT/PLAN:   (Z00.00) Routine general medical examination at a health care facility  " "(primary encounter diagnosis)  Comment: Experiencing diarrhea with some abdominal pain for about 1 year or so.  Physical examination unremarkable.  Differentials discussed in detail including but not limited to irritable bowel syndrome, inflammatory bowel disease and celiac disease.  CBC, CMP, TSH, CRP, ESR and tissue transglutaminase antibody tests ordered.  Recommended to continue well hydration, healthy diet and GI referral placed.  COVID-19 booster vaccine and influenza vaccine administered in office today.       (R19.7) Diarrhea, unspecified type  Comment: As above  Plan: CBC with platelets, Comprehensive metabolic         panel (BMP + Alb, Alk Phos, ALT, AST, Total.         Bili, TP), TSH with free T4 reflex, Tissue         transglutaminase kong IgA and IgG, IgA, Adult         Gastro Ref - Consult Only, ESR: Erythrocyte         sedimentation rate, CRP, inflammation           (Z11.4) Encounter for screening for HIV  Comment:   Plan: HIV Antigen Antibody Combo            COUNSELING:   Reviewed preventive health counseling, as reflected in patient instructions    Estimated body mass index is 33.69 kg/m  as calculated from the following:    Height as of this encounter: 1.816 m (5' 11.5\").    Weight as of this encounter: 111.1 kg (245 lb).     Weight management plan: Discussed healthy diet and exercise guidelines    He reports that he quit smoking about 2 years ago. His smoking use included cigarettes. He smoked 0.00 packs per day for 0.00 years. He has quit using smokeless tobacco.      Counseling Resources:  ATP IV Guidelines  Pooled Cohorts Equation Calculator  FRAX Risk Assessment  ICSI Preventive Guidelines  Dietary Guidelines for Americans, 2010  USDA's MyPlate  ASA Prophylaxis  Lung CA Screening      Anthony Loaiza MD  [facility]  "

## 2021-12-01 NOTE — LETTER
December 1, 2021      Walt Tompkins  [address] [facility] [address]        To Whom It May Concern:    Walt Tompkins was seen in our clinic. He may return to work without restrictions.      Sincerely,        Anthony Loaiza MD

## 2021-12-02 LAB
IGA SERPL-MCNC: 249 MG/DL (ref 84–499)
TTG IGA SER-ACNC: 0.9 U/ML
TTG IGG SER-ACNC: <0.6 U/ML

## 2021-12-23 ENCOUNTER — TRANSFERRED RECORDS (OUTPATIENT)
Dept: HEALTH INFORMATION MANAGEMENT | Facility: CLINIC | Age: 36
End: 2021-12-23
Payer: COMMERCIAL

## 2022-01-07 ENCOUNTER — TRANSFERRED RECORDS (OUTPATIENT)
Dept: HEALTH INFORMATION MANAGEMENT | Facility: CLINIC | Age: 37
End: 2022-01-07
Payer: COMMERCIAL

## 2022-03-18 ENCOUNTER — TRANSFERRED RECORDS (OUTPATIENT)
Dept: HEALTH INFORMATION MANAGEMENT | Facility: CLINIC | Age: 37
End: 2022-03-18
Payer: COMMERCIAL

## 2022-04-21 ENCOUNTER — TRANSFERRED RECORDS (OUTPATIENT)
Dept: HEALTH INFORMATION MANAGEMENT | Facility: CLINIC | Age: 37
End: 2022-04-21
Payer: COMMERCIAL

## 2022-08-08 ENCOUNTER — E-VISIT (OUTPATIENT)
Dept: FAMILY MEDICINE | Facility: OTHER | Age: 37
End: 2022-08-08
Payer: COMMERCIAL

## 2022-08-08 DIAGNOSIS — M10.9 GOUT INVOLVING TOE, UNSPECIFIED CAUSE, UNSPECIFIED CHRONICITY, UNSPECIFIED LATERALITY: ICD-10-CM

## 2022-08-08 PROCEDURE — 99421 OL DIG E/M SVC 5-10 MIN: CPT | Performed by: PHYSICIAN ASSISTANT

## 2022-08-08 RX ORDER — COLCHICINE 0.6 MG/1
TABLET ORAL
Qty: 12 TABLET | Refills: 1 | Status: SHIPPED | OUTPATIENT
Start: 2022-08-08 | End: 2023-09-25

## 2022-09-10 ENCOUNTER — HEALTH MAINTENANCE LETTER (OUTPATIENT)
Age: 37
End: 2022-09-10

## 2023-01-04 ENCOUNTER — VIRTUAL VISIT (OUTPATIENT)
Dept: FAMILY MEDICINE | Facility: CLINIC | Age: 38
End: 2023-01-04
Payer: COMMERCIAL

## 2023-01-04 DIAGNOSIS — J02.9 ACUTE PHARYNGITIS, UNSPECIFIED ETIOLOGY: Primary | ICD-10-CM

## 2023-01-04 PROCEDURE — 99213 OFFICE O/P EST LOW 20 MIN: CPT | Mod: GT | Performed by: STUDENT IN AN ORGANIZED HEALTH CARE EDUCATION/TRAINING PROGRAM

## 2023-01-04 RX ORDER — AMOXICILLIN 875 MG
875 TABLET ORAL 2 TIMES DAILY
Qty: 14 TABLET | Refills: 0 | Status: SHIPPED | OUTPATIENT
Start: 2023-01-04 | End: 2023-01-11

## 2023-01-04 ASSESSMENT — ENCOUNTER SYMPTOMS
SYNCOPE: 0
WHEEZING: 0
SWOLLEN GLANDS: 1
PND: 0
HEMOPTYSIS: 0
CLAUDICATION: 0
LEG PAIN: 0
RHINORRHEA: 1
SORE THROAT: 1
HEADACHES: 0
ORTHOPNEA: 0
NECK PAIN: 0
SPUTUM PRODUCTION: 1
ABDOMINAL PAIN: 0
FEVER: 0
VOMITING: 0

## 2023-01-04 ASSESSMENT — PATIENT HEALTH QUESTIONNAIRE - PHQ9
10. IF YOU CHECKED OFF ANY PROBLEMS, HOW DIFFICULT HAVE THESE PROBLEMS MADE IT FOR YOU TO DO YOUR WORK, TAKE CARE OF THINGS AT HOME, OR GET ALONG WITH OTHER PEOPLE: SOMEWHAT DIFFICULT
SUM OF ALL RESPONSES TO PHQ QUESTIONS 1-9: 3
SUM OF ALL RESPONSES TO PHQ QUESTIONS 1-9: 3

## 2023-01-04 NOTE — PROGRESS NOTES
Walt is a 37 year old who is being evaluated via a billable video visit.      How would you like to obtain your AVS? MyChart  If the video visit is dropped, the invitation should be resent by: Text to cell phone: [phone]  Will anyone else be joining your video visit? No        Assessment & Plan     Acute pharyngitis, unspecified etiology  Due to work schedule isn't able to come in for a strep swab. Has had cervical lymphadenopathy, fevers, and exudate noted so I would recommend treatment for strep pharnygitis with amoxicillin. Has had 3 negative covid tests   - amoxicillin (AMOXIL) 875 MG tablet; Take 1 tablet (875 mg) by mouth 2 times daily for 7 days    No follow-ups on file.    Amalia Bowen [facility]   Walt is a 37 year old, presenting for the following health issues:  No chief complaint on file.      History of Present Illness       Reason for visit:  Cold/flu symptoms. 3 negative covid tests  Symptom onset:  3-7 days ago  Symptoms include:  Fatigue, cough and a sore throat  Symptom intensity:  Moderate  Symptom progression:  Staying the same  Had these symptoms before:  Yes  Has tried/received treatment for these symptoms:  No  What makes it worse:  Being at work  What makes it better:  Frozen coke    He eats 2-3 servings of fruits and vegetables daily.He consumes 2 sweetened beverage(s) daily.He exercises with enough effort to increase his heart rate 10 to 19 minutes per day.  He exercises with enough effort to increase his heart rate 3 or less days per week.   He is taking medications regularly.    Today's PHQ-9         PHQ-9 Total Score: 3    PHQ-9 Q9 Thoughts of better off dead/self-harm past 2 weeks :   Not at all    How difficult have these problems made it for you to do your work, take care of things at home, or get along with other people: Somewhat difficult     Answers for HPI/ROS submitted by the patient on 1/4/2023  Chronicity:  new  Onset: in the past 7 days  Frequency: constantly  Progression since onset: gradually improving  Episode duration: 5 Minutes  claudication: No  coryza: Yes  hemoptysis: No  leg pain: No  leg swelling: No  orthopnea: No  PND: No  sputum production: Yes  swollen glands: Yes  syncope: No        Starting 7 days ago he had fever, chills, cough, body aches, sore throat. Symptoms lingering. Fever, chills and body aches gone but cough and sore throat are lingering. Has swelling on left side of neck lymph node. Noticed white spot on throat. Lot of exhaustion. Today throat starting to feel a little better,  But has Been     Still has tonsils.     Has had 3 negative covid tests.     Couple people at work have been sick, one had a bacterial pneumonia and have been hospitalized.     Was around kids at the holidays.     Has taken dayquil and tylenol and advil. chlorseptic spray.     No GI issues.           Review of Systems   Constitutional: Negative for fever.   HENT: Positive for ear pain, rhinorrhea and sore throat.    Respiratory: Negative for wheezing.    Cardiovascular: Positive for chest pain.   Gastrointestinal: Negative for abdominal pain and vomiting.   Musculoskeletal: Negative for neck pain.   Skin: Negative for rash.   Neurological: Negative for headaches.            Objective           Vitals:  No vitals were obtained today due to virtual visit.    Physical Exam   GENERAL: Healthy, alert and no distress  EYES: Eyes grossly normal to inspection.  No discharge or erythema, or obvious scleral/conjunctival abnormalities.  RESP: No audible wheeze, cough, or visible cyanosis.  No visible retractions or increased work of breathing.    SKIN: Visible skin clear. No significant rash, abnormal pigmentation or lesions.  NEURO: Cranial nerves grossly intact.  Mentation and speech appropriate for age.  PSYCH: Mentation appears normal, affect normal/bright, judgement and insight intact, normal speech and appearance  well-gronoy.            Video-Visit Details    Type of service:  Video Visit     Originating Location (pt. Location): Home  Distant Location (provider location):  On-site  Platform used for Video Visit: Willis

## 2023-01-21 ENCOUNTER — HEALTH MAINTENANCE LETTER (OUTPATIENT)
Age: 38
End: 2023-01-21

## 2023-02-14 ENCOUNTER — E-VISIT (OUTPATIENT)
Dept: FAMILY MEDICINE | Facility: OTHER | Age: 38
End: 2023-02-14
Payer: COMMERCIAL

## 2023-02-14 DIAGNOSIS — J02.9 ACUTE PHARYNGITIS, UNSPECIFIED ETIOLOGY: Primary | ICD-10-CM

## 2023-02-14 PROCEDURE — 99421 OL DIG E/M SVC 5-10 MIN: CPT | Performed by: PHYSICIAN ASSISTANT

## 2023-02-15 ENCOUNTER — APPOINTMENT (OUTPATIENT)
Dept: LAB | Facility: OTHER | Age: 38
End: 2023-02-15
Attending: PHYSICIAN ASSISTANT
Payer: COMMERCIAL

## 2023-02-15 LAB — DEPRECATED S PYO AG THROAT QL EIA: NEGATIVE

## 2023-02-15 PROCEDURE — 87651 STREP A DNA AMP PROBE: CPT | Performed by: PHYSICIAN ASSISTANT

## 2023-02-16 LAB — GROUP A STREP BY PCR: NOT DETECTED

## 2023-09-24 ENCOUNTER — MYC REFILL (OUTPATIENT)
Dept: FAMILY MEDICINE | Facility: CLINIC | Age: 38
End: 2023-09-24
Payer: COMMERCIAL

## 2023-09-24 DIAGNOSIS — R07.0 THROAT PAIN: ICD-10-CM

## 2023-09-25 ENCOUNTER — VIRTUAL VISIT (OUTPATIENT)
Dept: FAMILY MEDICINE | Facility: OTHER | Age: 38
End: 2023-09-25
Payer: COMMERCIAL

## 2023-09-25 DIAGNOSIS — M10.9 GOUT INVOLVING TOE, UNSPECIFIED CAUSE, UNSPECIFIED CHRONICITY, UNSPECIFIED LATERALITY: ICD-10-CM

## 2023-09-25 PROCEDURE — 99213 OFFICE O/P EST LOW 20 MIN: CPT | Mod: VID | Performed by: PHYSICIAN ASSISTANT

## 2023-09-25 RX ORDER — PREDNISONE 20 MG/1
20 TABLET ORAL DAILY
Qty: 5 TABLET | Refills: 0 | OUTPATIENT
Start: 2023-09-25

## 2023-09-25 RX ORDER — PREDNISONE 20 MG/1
20 TABLET ORAL DAILY
Qty: 5 TABLET | Refills: 1 | Status: SHIPPED | OUTPATIENT
Start: 2023-09-25 | End: 2023-09-30

## 2023-09-25 RX ORDER — COLCHICINE 0.6 MG/1
TABLET ORAL
Qty: 12 TABLET | Refills: 1 | Status: SHIPPED | OUTPATIENT
Start: 2023-09-25 | End: 2024-08-23

## 2023-09-25 NOTE — PROGRESS NOTES
Walt is a 38 year old who is being evaluated via a billable video visit.      How would you like to obtain your AVS? MyChart  If the video visit is dropped, the invitation should be resent by: Text to cell phone: [phone]  Will anyone else be joining your video visit? No    Assessment & Plan       ICD-10-CM    1. Gout involving toe, unspecified cause, unspecified chronicity, unspecified laterality  M10.9 colchicine (COLCRYS) 0.6 MG tablet     predniSONE (DELTASONE) 20 MG tablet          Symptoms consistent with recurrent gout. Will send over prednisone to take as directed and will also refill colchicine to have on hand for any future flares as he should not need both concomitantly. Continue NSAIDs and plenty of fluids. Avoid frequent alcohol, red meats and seafood to avoid future flares.    Recommend he schedule an annual physical in the near future.      MELVA [facility]   Walt is a 38 year old, presenting for the following health issues:  Arthritis and Refill Request        9/25/2023    11:16 AM   Additional Questions   Roomed by Sanya KNAPP   Accompanied by Self         9/25/2023    11:16 AM   Patient Reported Additional Medications   Patient reports taking the following new medications None       Arthritis    History of Present Illness       Reason for visit:  I need a refill of colchicine and prednisone for a gout flare up    He eats 2-3 servings of fruits and vegetables daily.He consumes 1 sweetened beverage(s) daily.He exercises with enough effort to increase his heart rate 30 to 60 minutes per day.  He exercises with enough effort to increase his heart rate 4 days per week.   He is taking medications regularly.         Gout/ Single Inflamed Joint  Onset/Duration: Flare up started 2 days ago  Description:   Location: foot - left  Joint Swelling: No  Redness: YES  Pain: YES  Intensity: moderate  Progression of Symptoms:  improving  Accompanying Signs & Symptoms:  Fevers: No  History:   Trauma to the area: No  Previous history of gout: YES  Recent illness: No  Alcohol use: YES- Beer   Diuretic use: No  Precipitating or alleviating factors: Beer may have been the cause of this flare up.   Therapies tried and outcome: heat, ibuprofen, fluids- helped quite a bit    Pain is below left pinky toe on the plantar foot. Pain, swelling and redness have overall improved over the past few days but he would like a refill of prednisone and colchicine.       Review of Systems   Musculoskeletal:  Positive for arthritis.      Constitutional, HEENT, cardiovascular, pulmonary, gi and gu systems are negative, except as otherwise noted.      Objective       Vitals:  No vitals were obtained today due to virtual visit.    Physical Exam   GENERAL: Healthy, alert and no distress  EYES: Eyes grossly normal to inspection.  No discharge or erythema, or obvious scleral/conjunctival abnormalities.  RESP: No audible wheeze, cough, or visible cyanosis.  No visible retractions or increased work of breathing.    SKIN: Visible skin clear. No significant rash, abnormal pigmentation or lesions.  NEURO: Cranial nerves grossly intact.  Mentation and speech appropriate for age.  PSYCH: Mentation appears normal, affect normal/bright, judgement and insight intact, normal speech and appearance well-groomed.        Video-Visit Details    Type of service:  Video Visit     Originating Location (pt. Location): Home  Distant Location (provider location):  Off-site  Platform used for Video Visit: [employer]

## 2023-09-25 NOTE — PATIENT INSTRUCTIONS
Will treat with prednisone and will send over colchicine to have on hand if flares occur in the future.    Stay hydrated.    Avoid red meats, alcohol and seafood.    Schedule an annual physical in the near future.

## 2023-10-24 ENCOUNTER — OFFICE VISIT (OUTPATIENT)
Dept: FAMILY MEDICINE | Facility: OTHER | Age: 38
End: 2023-10-24
Payer: COMMERCIAL

## 2023-10-24 VITALS
HEART RATE: 80 BPM | BODY MASS INDEX: 33.52 KG/M2 | OXYGEN SATURATION: 98 % | HEIGHT: 72 IN | WEIGHT: 247.5 LBS | TEMPERATURE: 98.1 F | DIASTOLIC BLOOD PRESSURE: 78 MMHG | SYSTOLIC BLOOD PRESSURE: 122 MMHG | RESPIRATION RATE: 20 BRPM

## 2023-10-24 DIAGNOSIS — H60.502 ACUTE OTITIS EXTERNA OF LEFT EAR, UNSPECIFIED TYPE: Primary | ICD-10-CM

## 2023-10-24 PROCEDURE — 99213 OFFICE O/P EST LOW 20 MIN: CPT | Performed by: PHYSICIAN ASSISTANT

## 2023-10-24 RX ORDER — NEOMYCIN SULFATE, POLYMYXIN B SULFATE, HYDROCORTISONE 3.5; 10000; 1 MG/ML; [USP'U]/ML; MG/ML
3 SOLUTION/ DROPS AURICULAR (OTIC) 4 TIMES DAILY
Qty: 10 ML | Refills: 0 | Status: SHIPPED | OUTPATIENT
Start: 2023-10-24 | End: 2023-10-29

## 2023-10-24 NOTE — PROGRESS NOTES
"  Assessment & Plan     Acute otitis externa of left ear, unspecified type  Patient with discharge and erythema in the left external canal consistent with otitis externa. Reviewed use of drops and home cares. Reference material attached to the AVS.   - neomycin-polymyxin-hydrocortisone (CORTISPORIN) 3.5-73197-6 otic solution; Place 3 drops Into the left ear 4 times daily for 5 days    [facility] is a 38 year old, presenting for the following health issues:  Ear Problem (Left ear)      10/24/2023     8:05 AM   Additional Questions   Roomed by Chela MARCH   Accompanied by self       Ear Problem    History of Present Illness       Reason for visit:  Hearing Loss in my Left ear  Symptom onset:  3-4 weeks ago  Symptoms include:  Ear draining fluid  Symptom intensity:  Mild  Symptom progression:  Staying the same  Had these symptoms before:  No    He eats 2-3 servings of fruits and vegetables daily.He consumes 0 sweetened beverage(s) daily.He exercises with enough effort to increase his heart rate 30 to 60 minutes per day.  He exercises with enough effort to increase his heart rate 4 days per week.   He is taking medications regularly.     On Antibiotic currently for his finger just FYI, it is Augmentin, added to his med list. He is supposed to take that for a week starting 10/22/23.    Review of Systems   HENT:  Positive for ear pain.             Objective    /78   Pulse 80   Temp 98.1  F (36.7  C) (Temporal)   Resp 20   Ht 1.822 m (5' 11.75\")   Wt 112.3 kg (247 lb 8 oz)   SpO2 98%   BMI 33.80 kg/m    Body mass index is 33.8 kg/m .  Physical Exam   GENERAL: healthy, alert and no distress  HENT: normal cephalic/atraumatic, right ear: normal: no effusions, no erythema, normal landmarks, left ear: occluded with wax/discharge, nose and mouth without ulcers or lesions, oropharynx clear, and oral mucous membranes moist  NECK: no adenopathy, no " asymmetry, masses, or scars and thyroid normal to palpation  RESP: lungs clear to auscultation - no rales, rhonchi or wheezes  CV: regular rate and rhythm, normal S1 S2, no S3 or S4, no murmur, click or rub, no peripheral edema and peripheral pulses strong  PSYCH: mentation appears normal, affect normal/bright    Cerumenosis is noted.  Wax is removed by syringing and manual debridement. Instructions for home care to prevent wax buildup are given.    Repeat left ear: erythematous canal, scant discharge/wax still present against TM

## 2024-02-18 ENCOUNTER — HEALTH MAINTENANCE LETTER (OUTPATIENT)
Age: 39
End: 2024-02-18

## 2024-08-23 ENCOUNTER — MYC REFILL (OUTPATIENT)
Dept: FAMILY MEDICINE | Facility: OTHER | Age: 39
End: 2024-08-23
Payer: COMMERCIAL

## 2024-08-23 ENCOUNTER — MYC REFILL (OUTPATIENT)
Dept: FAMILY MEDICINE | Facility: CLINIC | Age: 39
End: 2024-08-23
Payer: COMMERCIAL

## 2024-08-23 DIAGNOSIS — M10.9 GOUT INVOLVING TOE, UNSPECIFIED CAUSE, UNSPECIFIED CHRONICITY, UNSPECIFIED LATERALITY: ICD-10-CM

## 2024-08-23 DIAGNOSIS — R07.0 THROAT PAIN: ICD-10-CM

## 2024-08-23 RX ORDER — PREDNISONE 20 MG/1
20 TABLET ORAL DAILY
Qty: 5 TABLET | Refills: 0 | Status: CANCELLED | OUTPATIENT
Start: 2024-08-23

## 2024-08-26 RX ORDER — COLCHICINE 0.6 MG/1
TABLET ORAL
Qty: 12 TABLET | Refills: 0 | Status: SHIPPED | OUTPATIENT
Start: 2024-08-26

## 2024-08-26 NOTE — TELEPHONE ENCOUNTER
RN called patient and he is not in need of prednisone.     Shantal Sanchez RN  [facility] - Registered Nurse  Clinic Triage [facility]   August 26, 2024

## 2024-08-26 NOTE — TELEPHONE ENCOUNTER
Patient was seen in ED and was given 3 days of colchicine (COLCRYS) and he is looking for refill on this. He said he would like to pick this up today so he can go back to work tomorrow.     Please call him at [phone].

## 2024-08-26 NOTE — TELEPHONE ENCOUNTER
Giving one refill but this patient has not been seen for a physical or labs for his gout in 3 years, needs to be seen in person    Fauzia Mitchell PA-C

## 2024-08-26 NOTE — TELEPHONE ENCOUNTER
Clinic RN: Please contact patient because this medication was prescribed for an acute condition. Confirm current symptoms/need for medication and possible need for appointment. If necessary, document reason and route refill encounter to provider for approval or denial.    Freida Vargas, JOSUÉN, RN

## 2024-08-28 ENCOUNTER — OFFICE VISIT (OUTPATIENT)
Dept: FAMILY MEDICINE | Facility: OTHER | Age: 39
End: 2024-08-28
Payer: COMMERCIAL

## 2024-08-28 VITALS
WEIGHT: 247 LBS | RESPIRATION RATE: 18 BRPM | BODY MASS INDEX: 33.46 KG/M2 | HEART RATE: 87 BPM | TEMPERATURE: 97.7 F | OXYGEN SATURATION: 97 % | DIASTOLIC BLOOD PRESSURE: 82 MMHG | HEIGHT: 72 IN | SYSTOLIC BLOOD PRESSURE: 130 MMHG

## 2024-08-28 DIAGNOSIS — Z11.59 NEED FOR HEPATITIS C SCREENING TEST: ICD-10-CM

## 2024-08-28 DIAGNOSIS — R79.89 LFT ELEVATION: ICD-10-CM

## 2024-08-28 DIAGNOSIS — M10.9 ACUTE GOUTY ARTHRITIS: Primary | ICD-10-CM

## 2024-08-28 LAB
ALBUMIN SERPL BCG-MCNC: 4.4 G/DL (ref 3.5–5.2)
ALP SERPL-CCNC: 60 U/L (ref 40–150)
ALT SERPL W P-5'-P-CCNC: 44 U/L (ref 0–70)
ANION GAP SERPL CALCULATED.3IONS-SCNC: 13 MMOL/L (ref 7–15)
AST SERPL W P-5'-P-CCNC: 27 U/L (ref 0–45)
BASOPHILS # BLD AUTO: 0 10E3/UL (ref 0–0.2)
BASOPHILS NFR BLD AUTO: 0 %
BILIRUB SERPL-MCNC: 0.5 MG/DL
BUN SERPL-MCNC: 13.5 MG/DL (ref 6–20)
CALCIUM SERPL-MCNC: 9.5 MG/DL (ref 8.8–10.4)
CHLORIDE SERPL-SCNC: 106 MMOL/L (ref 98–107)
CREAT SERPL-MCNC: 0.94 MG/DL (ref 0.67–1.17)
CRP SERPL-MCNC: <3 MG/L
EGFRCR SERPLBLD CKD-EPI 2021: >90 ML/MIN/1.73M2
EOSINOPHIL # BLD AUTO: 0 10E3/UL (ref 0–0.7)
EOSINOPHIL NFR BLD AUTO: 0 %
ERYTHROCYTE [DISTWIDTH] IN BLOOD BY AUTOMATED COUNT: 11.6 % (ref 10–15)
ERYTHROCYTE [SEDIMENTATION RATE] IN BLOOD BY WESTERGREN METHOD: 9 MM/HR (ref 0–15)
GLUCOSE SERPL-MCNC: 115 MG/DL (ref 70–99)
HCO3 SERPL-SCNC: 24 MMOL/L (ref 22–29)
HCT VFR BLD AUTO: 44.8 % (ref 40–53)
HGB BLD-MCNC: 15.2 G/DL (ref 13.3–17.7)
IMM GRANULOCYTES # BLD: 0 10E3/UL
IMM GRANULOCYTES NFR BLD: 0 %
LYMPHOCYTES # BLD AUTO: 1.4 10E3/UL (ref 0.8–5.3)
LYMPHOCYTES NFR BLD AUTO: 20 %
MCH RBC QN AUTO: 30.3 PG (ref 26.5–33)
MCHC RBC AUTO-ENTMCNC: 33.9 G/DL (ref 31.5–36.5)
MCV RBC AUTO: 89 FL (ref 78–100)
MONOCYTES # BLD AUTO: 0.2 10E3/UL (ref 0–1.3)
MONOCYTES NFR BLD AUTO: 3 %
NEUTROPHILS # BLD AUTO: 5.3 10E3/UL (ref 1.6–8.3)
NEUTROPHILS NFR BLD AUTO: 76 %
PLATELET # BLD AUTO: 203 10E3/UL (ref 150–450)
POTASSIUM SERPL-SCNC: 3.9 MMOL/L (ref 3.4–5.3)
PROT SERPL-MCNC: 7.4 G/DL (ref 6.4–8.3)
RBC # BLD AUTO: 5.01 10E6/UL (ref 4.4–5.9)
SODIUM SERPL-SCNC: 143 MMOL/L (ref 135–145)
URATE SERPL-MCNC: 7.2 MG/DL (ref 3.4–7)
WBC # BLD AUTO: 7 10E3/UL (ref 4–11)

## 2024-08-28 PROCEDURE — 86140 C-REACTIVE PROTEIN: CPT | Performed by: PHYSICIAN ASSISTANT

## 2024-08-28 PROCEDURE — 80053 COMPREHEN METABOLIC PANEL: CPT | Performed by: PHYSICIAN ASSISTANT

## 2024-08-28 PROCEDURE — 86803 HEPATITIS C AB TEST: CPT | Performed by: PHYSICIAN ASSISTANT

## 2024-08-28 PROCEDURE — 85652 RBC SED RATE AUTOMATED: CPT | Performed by: PHYSICIAN ASSISTANT

## 2024-08-28 PROCEDURE — 85025 COMPLETE CBC W/AUTO DIFF WBC: CPT | Performed by: PHYSICIAN ASSISTANT

## 2024-08-28 PROCEDURE — 99214 OFFICE O/P EST MOD 30 MIN: CPT | Performed by: PHYSICIAN ASSISTANT

## 2024-08-28 PROCEDURE — 36415 COLL VENOUS BLD VENIPUNCTURE: CPT | Performed by: PHYSICIAN ASSISTANT

## 2024-08-28 PROCEDURE — 84550 ASSAY OF BLOOD/URIC ACID: CPT | Performed by: PHYSICIAN ASSISTANT

## 2024-08-28 RX ORDER — ALLOPURINOL 100 MG/1
100 TABLET ORAL DAILY
Qty: 90 TABLET | Refills: 1 | Status: SHIPPED | OUTPATIENT
Start: 2024-08-28

## 2024-08-28 RX ORDER — INDOMETHACIN 50 MG/1
50 CAPSULE ORAL
Qty: 21 CAPSULE | Refills: 2 | Status: SHIPPED | OUTPATIENT
Start: 2024-08-28

## 2024-08-28 RX ORDER — METHYLPREDNISOLONE 4 MG
TABLET, DOSE PACK ORAL
Qty: 21 TABLET | Refills: 0 | Status: SHIPPED | OUTPATIENT
Start: 2024-08-28

## 2024-08-28 ASSESSMENT — PAIN SCALES - GENERAL: PAINLEVEL: SEVERE PAIN (7)

## 2024-08-28 NOTE — PROGRESS NOTES
Assessment & Plan       ICD-10-CM    1. Acute gouty arthritis  M10.9 Uric acid     CBC with platelets and differential     methylPREDNISolone (MEDROL DOSEPAK) 4 MG tablet therapy pack     indomethacin (INDOCIN) 50 MG capsule     ESR: Erythrocyte sedimentation rate     CRP, inflammation     allopurinol (ZYLOPRIM) 100 MG tablet     Uric acid     CBC with platelets and differential     ESR: Erythrocyte sedimentation rate     CRP, inflammation      2. LFT elevation  R79.89 Comprehensive metabolic panel (BMP + Alb, Alk Phos, ALT, AST, Total. Bili, TP)     Comprehensive metabolic panel (BMP + Alb, Alk Phos, ALT, AST, Total. Bili, TP)      3. Need for hepatitis C screening test  Z11.59 Hepatitis C Screen Reflex to HCV RNA Quant and Genotype     Hepatitis C Screen Reflex to HCV RNA Quant and Genotype          1. Symptoms and exam findings most consistent with acute gout, although cannot complete rule out infection/cellulitis given that he has not responded to prednisone and colchicine. Labs ordered and so far, ESR and CBC with diff are normal which makes infection less likely, along with normal vitals. Will send over a Medrol dose pack and indomethacin to take as directed. I recommend he continue to push the fluids and avoid seafood, alcohol and red meat. He will start allopurinol 100 mg 1 week after symptoms resolve. If not improving, he will contact the clinic.    2. Updated CMP ordered as LFTs elevated in 2021.    3. Hepatitis C screening ordered.    Ernestine Godoy is a 38 year old, presenting for the following health issues:  Arthritis (Left foot gout )      8/28/2024     2:22 PM   Additional Questions   Roomed by Yoselin HOOD     Arthritis    History of Present Illness       Reason for visit:  Gout flare up not improving   He is taking medications regularly.       Medication Followup of Colchicine, and prednisone  Taking Medication as prescribed: yes  Side Effects:  None  Medication Helping Symptoms:  NO    He  "has had a \"gout flare\" in the left foot since Saturday. He received prednisone from urgent care, 40 mg initially and then 20 mg daily since then without any benefit. He has also taken colchicine and took an indomethacin from a friend, both without benefit. The pain, redness, and swelling is below his 2nd-4th toe of the left foot rather than the big toe like it usually is. It is difficult to put a shoe on so he has been wearing slides. He denies fevers or chills.       Review of Systems  Constitutional, HEENT, cardiovascular, pulmonary, gi and gu systems are negative, except as otherwise noted.      Objective    /82   Pulse 87   Temp 97.7  F (36.5  C) (Temporal)   Resp 18   Ht 1.829 m (6')   Wt 112 kg (247 lb)   SpO2 97%   BMI 33.50 kg/m    Body mass index is 33.5 kg/m .  Physical Exam   GENERAL: alert and no distress  RESP: lungs clear to auscultation - no rales, rhonchi or wheezes  CV: regular rate and rhythm, normal S1 S2, no S3 or S4, no murmur, click or rub, no peripheral edema  MS: Distal left foot with swelling, warmth and erythema just below the 2nd-4th toes with tenderness over the dorsal foot and plantar foot in this area. Full toe range of motion.   NEURO: Normal strength and tone, mentation intact and speech normal. Gait minimally antalgic.         Results for orders placed or performed in visit on 08/28/24   ESR: Erythrocyte sedimentation rate     Status: Normal   Result Value Ref Range    Erythrocyte Sedimentation Rate 9 0 - 15 mm/hr   CBC with platelets and differential     Status: None   Result Value Ref Range    WBC Count 7.0 4.0 - 11.0 10e3/uL    RBC Count 5.01 4.40 - 5.90 10e6/uL    Hemoglobin 15.2 13.3 - 17.7 g/dL    Hematocrit 44.8 40.0 - 53.0 %    MCV 89 78 - 100 fL    MCH 30.3 26.5 - 33.0 pg    MCHC 33.9 31.5 - 36.5 g/dL    RDW 11.6 10.0 - 15.0 %    Platelet Count 203 150 - 450 10e3/uL    % Neutrophils 76 %    % Lymphocytes 20 %    % Monocytes 3 %    % Eosinophils 0 %    % " Basophils 0 %    % Immature Granulocytes 0 %    Absolute Neutrophils 5.3 1.6 - 8.3 10e3/uL    Absolute Lymphocytes 1.4 0.8 - 5.3 10e3/uL    Absolute Monocytes 0.2 0.0 - 1.3 10e3/uL    Absolute Eosinophils 0.0 0.0 - 0.7 10e3/uL    Absolute Basophils 0.0 0.0 - 0.2 10e3/uL    Absolute Immature Granulocytes 0.0 <=0.4 10e3/uL   CBC with platelets and differential     Status: None    Narrative    The following orders were created for panel order CBC with platelets and differential.  Procedure                               Abnormality         Status                     ---------                               -----------         ------                     CBC with platelets and d...[[id number]]                      Final result                 Please view results for these tests on the individual orders.         Signed Electronically by: Can Heard PA-C

## 2024-08-28 NOTE — PATIENT INSTRUCTIONS
This seems like it is gout so will send over a different steroid along with indomethacin.  Will check labs to rule out infection.  If not improving, will treat as infection.  Will start allopurinol 1 week after the flare is gone.

## 2024-08-29 ENCOUNTER — MYC MEDICAL ADVICE (OUTPATIENT)
Dept: FAMILY MEDICINE | Facility: OTHER | Age: 39
End: 2024-08-29
Payer: COMMERCIAL

## 2024-08-29 LAB — HCV AB SERPL QL IA: NONREACTIVE

## 2025-01-15 SDOH — HEALTH STABILITY: PHYSICAL HEALTH: ON AVERAGE, HOW MANY MINUTES DO YOU ENGAGE IN EXERCISE AT THIS LEVEL?: 20 MIN

## 2025-01-15 SDOH — HEALTH STABILITY: PHYSICAL HEALTH: ON AVERAGE, HOW MANY DAYS PER WEEK DO YOU ENGAGE IN MODERATE TO STRENUOUS EXERCISE (LIKE A BRISK WALK)?: 2 DAYS

## 2025-01-15 ASSESSMENT — SOCIAL DETERMINANTS OF HEALTH (SDOH): HOW OFTEN DO YOU GET TOGETHER WITH FRIENDS OR RELATIVES?: ONCE A WEEK

## 2025-01-16 ENCOUNTER — OFFICE VISIT (OUTPATIENT)
Dept: FAMILY MEDICINE | Facility: OTHER | Age: 40
End: 2025-01-16
Payer: COMMERCIAL

## 2025-01-16 VITALS
HEART RATE: 87 BPM | WEIGHT: 261 LBS | HEIGHT: 72 IN | BODY MASS INDEX: 35.35 KG/M2 | TEMPERATURE: 98 F | DIASTOLIC BLOOD PRESSURE: 84 MMHG | SYSTOLIC BLOOD PRESSURE: 122 MMHG | RESPIRATION RATE: 16 BRPM | OXYGEN SATURATION: 98 %

## 2025-01-16 DIAGNOSIS — Z13.6 CARDIOVASCULAR SCREENING; LDL GOAL LESS THAN 160: ICD-10-CM

## 2025-01-16 DIAGNOSIS — Z00.00 ENCOUNTER FOR ROUTINE ADULT HEALTH EXAMINATION WITHOUT ABNORMAL FINDINGS: Primary | ICD-10-CM

## 2025-01-16 DIAGNOSIS — G47.33 OSA (OBSTRUCTIVE SLEEP APNEA): ICD-10-CM

## 2025-01-16 DIAGNOSIS — Z23 NEED FOR INFLUENZA VACCINATION: ICD-10-CM

## 2025-01-16 DIAGNOSIS — Z23 NEED FOR COVID-19 VACCINE: ICD-10-CM

## 2025-01-16 DIAGNOSIS — R53.83 FATIGUE, UNSPECIFIED TYPE: ICD-10-CM

## 2025-01-16 DIAGNOSIS — E66.09 CLASS 2 OBESITY DUE TO EXCESS CALORIES WITHOUT SERIOUS COMORBIDITY WITH BODY MASS INDEX (BMI) OF 35.0 TO 35.9 IN ADULT: ICD-10-CM

## 2025-01-16 DIAGNOSIS — E66.812 CLASS 2 OBESITY DUE TO EXCESS CALORIES WITHOUT SERIOUS COMORBIDITY WITH BODY MASS INDEX (BMI) OF 35.0 TO 35.9 IN ADULT: ICD-10-CM

## 2025-01-16 LAB
ALBUMIN SERPL BCG-MCNC: 4.9 G/DL (ref 3.5–5.2)
ALP SERPL-CCNC: 53 U/L (ref 40–150)
ALT SERPL W P-5'-P-CCNC: 60 U/L (ref 0–70)
ANION GAP SERPL CALCULATED.3IONS-SCNC: 12 MMOL/L (ref 7–15)
AST SERPL W P-5'-P-CCNC: 28 U/L (ref 0–45)
BASOPHILS # BLD AUTO: 0.1 10E3/UL (ref 0–0.2)
BASOPHILS NFR BLD AUTO: 1 %
BILIRUB SERPL-MCNC: 0.6 MG/DL
BUN SERPL-MCNC: 13.9 MG/DL (ref 6–20)
CALCIUM SERPL-MCNC: 9.7 MG/DL (ref 8.8–10.4)
CHLORIDE SERPL-SCNC: 108 MMOL/L (ref 98–107)
CHOLEST SERPL-MCNC: 227 MG/DL
CREAT SERPL-MCNC: 1.03 MG/DL (ref 0.67–1.17)
EGFRCR SERPLBLD CKD-EPI 2021: >90 ML/MIN/1.73M2
EOSINOPHIL # BLD AUTO: 0.1 10E3/UL (ref 0–0.7)
EOSINOPHIL NFR BLD AUTO: 2 %
ERYTHROCYTE [DISTWIDTH] IN BLOOD BY AUTOMATED COUNT: 11.5 % (ref 10–15)
FASTING STATUS PATIENT QL REPORTED: YES
FASTING STATUS PATIENT QL REPORTED: YES
GLUCOSE SERPL-MCNC: 102 MG/DL (ref 70–99)
HCO3 SERPL-SCNC: 23 MMOL/L (ref 22–29)
HCT VFR BLD AUTO: 46 % (ref 40–53)
HDLC SERPL-MCNC: 42 MG/DL
HGB BLD-MCNC: 16.1 G/DL (ref 13.3–17.7)
IMM GRANULOCYTES # BLD: 0 10E3/UL
IMM GRANULOCYTES NFR BLD: 0 %
LDLC SERPL CALC-MCNC: 163 MG/DL
LYMPHOCYTES # BLD AUTO: 2.4 10E3/UL (ref 0.8–5.3)
LYMPHOCYTES NFR BLD AUTO: 42 %
MCH RBC QN AUTO: 30.8 PG (ref 26.5–33)
MCHC RBC AUTO-ENTMCNC: 35 G/DL (ref 31.5–36.5)
MCV RBC AUTO: 88 FL (ref 78–100)
MONOCYTES # BLD AUTO: 0.4 10E3/UL (ref 0–1.3)
MONOCYTES NFR BLD AUTO: 6 %
NEUTROPHILS # BLD AUTO: 2.8 10E3/UL (ref 1.6–8.3)
NEUTROPHILS NFR BLD AUTO: 49 %
NONHDLC SERPL-MCNC: 185 MG/DL
PLATELET # BLD AUTO: 225 10E3/UL (ref 150–450)
POTASSIUM SERPL-SCNC: 4.2 MMOL/L (ref 3.4–5.3)
PROT SERPL-MCNC: 7.7 G/DL (ref 6.4–8.3)
RBC # BLD AUTO: 5.22 10E6/UL (ref 4.4–5.9)
SODIUM SERPL-SCNC: 143 MMOL/L (ref 135–145)
TRIGL SERPL-MCNC: 110 MG/DL
TSH SERPL DL<=0.005 MIU/L-ACNC: 2.41 UIU/ML (ref 0.3–4.2)
VIT D+METAB SERPL-MCNC: 13 NG/ML (ref 20–50)
WBC # BLD AUTO: 5.7 10E3/UL (ref 4–11)

## 2025-01-16 ASSESSMENT — PAIN SCALES - GENERAL: PAINLEVEL_OUTOF10: NO PAIN (0)

## 2025-01-16 NOTE — PROGRESS NOTES
"Preventive Care Visit  [facility]  Can Heard PA-C, Family Medicine  Jan 16, 2025    Assessment & Plan       ICD-10-CM    1. Encounter for routine adult health examination without abnormal findings  Z00.00       2. Class 2 obesity due to excess calories without serious comorbidity with body mass index (BMI) of 35.0 to 35.9 in adult  E66.812     E66.09     Z68.35       3. CORY (obstructive sleep apnea)  G47.33       4. Fatigue, unspecified type  R53.83 Comprehensive metabolic panel (BMP + Alb, Alk Phos, ALT, AST, Total. Bili, TP)     CBC with platelets and differential     TSH with free T4 reflex     Vitamin D Deficiency      5. CARDIOVASCULAR SCREENING; LDL GOAL LESS THAN 160  Z13.6 Lipid panel reflex to direct LDL Fasting      6. Need for influenza vaccination  Z23 INFLUENZA VACCINE,SPLIT VIRUS,TRIVALENT,PF(FLUZONE)      7. Need for COVID-19 vaccine  Z23 COVID-19 12+ (PFIZER)          1. Annual exam completed.    2-4. He has gained some weight this past year and is up to 261 pounds. I discussed the importance of a healthier diet with more routine exercise which should help with his fatigue. He remains on a CPAP nightly and I recommend he consider a daily vitamin D3 supplement. Will check labs today to further evaluate his increased fatigue but his vitals and exam are reassuring. An acute illness is possible but he denies any other symptoms. Will continue to monitor symptoms closely.    5. Fasting lipid panel ordered.    6-7. Vaccines administered by MA.    Follow-up annually.    Patient has been advised of split billing requirements and indicates understanding: Yes        BMI  Estimated body mass index is 35.39 kg/m  as calculated from the following:    Height as of this encounter: 1.829 m (6' 0.01\").    Weight as of this encounter: 118.4 kg (261 lb).   Weight management plan: Discussed healthy diet and exercise guidelines    Counseling  Appropriate preventive services were addressed " with this patient via screening, questionnaire, or discussion as appropriate for fall prevention, nutrition, physical activity, Tobacco-use cessation, social engagement, weight loss and cognition.  Checklist reviewing preventive services available has been given to the patient.  Reviewed patient's diet, addressing concerns and/or questions.   He is at risk for lack of exercise and has been provided with information to increase physical activity for the benefit of his well-being.   The patient reports drinking more than 3 alcoholic drinks per day and/or more than 7 drhnks per week. The patient was counseled and given information about possible harmful effects of excessive alcohol intake.      Ernestine Godoy is a 39 year old, presenting for the following:  Physical        1/16/2025     8:11 AM   Additional Questions   Roomed by Yoselin GRIMALDO    He has been more tired the past few weeks with some generalized weakness. He has been working more lately and has put on some weight. He denies fevers, chills, sweats, cough, shortness of breath or GI symptoms. He sleeps well at night and uses his CPAP consistently.       Health Care Directive  Patient does not have a Health Care Directive: Discussed advance care planning with patient; however, patient declined at this time.      1/15/2025   General Health   How would you rate your overall physical health? (!) FAIR   Feel stress (tense, anxious, or unable to sleep) Only a little   (!) STRESS CONCERN      1/15/2025   Nutrition   Three or more servings of calcium each day? Yes   Diet: I don't know   How many servings of fruit and vegetables per day? (!) 2-3   How many sweetened beverages each day? 0-1         1/15/2025   Exercise   Days per week of moderate/strenous exercise 2 days   Average minutes spent exercising at this level 20 min   (!) EXERCISE CONCERN      1/15/2025   Social Factors   Frequency of gathering with friends or relatives Once a week   Worry  food won't last until get money to buy more No   Food not last or not have enough money for food? No   Do you have housing? (Housing is defined as stable permanent housing and does not include staying ouside in a car, in a tent, in an abandoned building, in an overnight shelter, or couch-surfing.) Yes   Are you worried about losing your housing? No   Lack of transportation? No   Unable to get utilities (heat,electricity)? No         1/15/2025   Dental   Dentist two times every year? Yes         1/15/2025   TB Screening   Were you born outside of the [place]? No         Today's PHQ-2 Score:       1/15/2025     6:26 AM   PHQ-2 (  [employer])   Q1: Little interest or pleasure in doing things 1   Q2: Feeling down, depressed or hopeless 0   PHQ-2 Score 1    Q1: Little interest or pleasure in doing things Several days   Q2: Feeling down, depressed or hopeless Not at all   PHQ-2 Score 1       Patient-reported           1/15/2025   Substance Use   Alcohol more than 3/day or more than 7/wk Yes   How often do you have a drink containing alcohol 2 to 3 times a week   How many alcohol drinks on typical day 3 or 4   How often do you have 5+ drinks at one occasion Weekly   Audit 2/3 Score 4   How often not able to stop drinking once started Never   How often failed to do what normally expected Never   How often needed first drink in am after a heavy drinking session Never   How often feeling of guilt or remorse after drinking Monthly   How often unable to remember what happened the night before Never   Have you or someone else been injured because of your drinking No   Has anyone been concerned or suggested you cut down on drinking No   TOTAL SCORE - AUDIT 9   Do you use any other substances recreationally? No     Social History     Tobacco Use    Smoking status: Former     Current packs/day: 0.00     Types: Cigarettes     Quit date: 5/15/2019     Years since quittin.6    Smokeless tobacco: Former    Tobacco comments:     E cig,  "used to chew   Vaping Use    Vaping status: Former   Substance Use Topics    Alcohol use: Yes     Comment: once per week    Drug use: No           1/15/2025   STI Screening   New sexual partner(s) since last STI/HIV test? No         1/15/2025   Contraception/Family Planning   Questions about contraception or family planning No     Reviewed and updated as needed this visit by Provider   Tobacco  Allergies  Meds  Problems  Med Hx  Surg Hx  Fam Hx              Review of Systems  Constitutional, HEENT, cardiovascular, pulmonary, GI, , musculoskeletal, neuro, skin, endocrine and psych systems are negative, except as otherwise noted.     Objective    Exam  /84   Pulse 87   Temp 98  F (36.7  C) (Temporal)   Resp 16   Ht 1.829 m (6' 0.01\")   Wt 118.4 kg (261 lb)   SpO2 98%   BMI 35.39 kg/m     Estimated body mass index is 35.39 kg/m  as calculated from the following:    Height as of this encounter: 1.829 m (6' 0.01\").    Weight as of this encounter: 118.4 kg (261 lb).    Physical Exam  GENERAL: healthy, alert and no distress  EYES: Eyes grossly normal to inspection, PERRL and conjunctivae and sclerae normal  HENT: ear canals and TM's normal, nose and mouth without ulcers or lesions  NECK: no adenopathy, no asymmetry, masses, or scars and thyroid normal to palpation  RESP: lungs clear to auscultation - no rales, rhonchi or wheezes  CV: regular rate and rhythm, normal S1 S2, no S3 or S4, no murmur, click or rub, no peripheral edema and peripheral pulses strong  ABDOMEN: soft, nontender, no hepatosplenomegaly, no masses and bowel sounds normal   (male): normal male circumcised genitalia without lesions or urethral discharge, no hernia  MS: no gross musculoskeletal defects noted, no edema. FROM to all extremities. No spinal tenderness.   SKIN: no suspicious lesions or rashes  NEURO: Normal strength and tone, mentation intact and speech normal. Cranial nerves II-XII are grossly intact. DTRs are 2+/4 " throughout and symmetric. Gait is stable.  PSYCH: mentation appears normal, affect normal/bright      Signed Electronically by: Can Heard PA-C

## 2025-01-16 NOTE — PATIENT INSTRUCTIONS
Patient Education     Keep cutting back on the drinking.    Work on a healthier lifestyle and consider adding vitamin D.    Follow-up annually.  Preventive Care Advice   This is general advice given by our system to help you stay healthy. However, your care team may have specific advice just for you. Please talk to your care team about your preventive care needs.  Nutrition  Eat 5 or more servings of fruits and vegetables each day.  Try wheat bread, brown rice and whole grain pasta (instead of white bread, rice, and pasta).  Get enough calcium and vitamin D. Check the label on foods and aim for 100% of the RDA (recommended daily allowance).  Lifestyle  Exercise at least 150 minutes each week  (30 minutes a day, 5 days a week).  Do muscle strengthening activities 2 days a week. These help control your weight and prevent disease.  No smoking.  Wear sunscreen to prevent skin cancer.  Have a dental exam and cleaning every 6 months.  Yearly exams  See your health care team every year to talk about:  Any changes in your health.  Any medicines your care team has prescribed.  Preventive care, family planning, and ways to prevent chronic diseases.  Shots (vaccines)   HPV shots (up to age 26), if you've never had them before.  Hepatitis B shots (up to age 59), if you've never had them before.  COVID-19 shot: Get this shot when it's due.  Flu shot: Get a flu shot every year.  Tetanus shot: Get a tetanus shot every 10 years.  Pneumococcal, hepatitis A, and RSV shots: Ask your care team if you need these based on your risk.  Shingles shot (for age 50 and up)  General health tests  Diabetes screening:  Starting at age 35, Get screened for diabetes at least every 3 years.  If you are younger than age 35, ask your care team if you should be screened for diabetes.  Cholesterol test: At age 39, start having a cholesterol test every 5 years, or more often if advised.  Bone density scan (DEXA): At age 50, ask your care team if you  should have this scan for osteoporosis (brittle bones).  Hepatitis C: Get tested at least once in your life.  STIs (sexually transmitted infections)  Before age 24: Ask your care team if you should be screened for STIs.  After age 24: Get screened for STIs if you're at risk. You are at risk for STIs (including HIV) if:  You are sexually active with more than one person.  You don't use condoms every time.  You or a partner was diagnosed with a sexually transmitted infection.  If you are at risk for HIV, ask about PrEP medicine to prevent HIV.  Get tested for HIV at least once in your life, whether you are at risk for HIV or not.  Cancer screening tests  Cervical cancer screening: If you have a cervix, begin getting regular cervical cancer screening tests starting at age 21.  Breast cancer scan (mammogram): If you've ever had breasts, begin having regular mammograms starting at age 40. This is a scan to check for breast cancer.  Colon cancer screening: It is important to start screening for colon cancer at age 45.  Have a colonoscopy test every 10 years (or more often if you're at risk) Or, ask your provider about stool tests like a FIT test every year or Cologuard test every 3 years.  To learn more about your testing options, visit:   .  For help making a decision, visit:   https://bit.ly/hg16513.  Prostate cancer screening test: If you have a prostate, ask your care team if a prostate cancer screening test (PSA) at age 55 is right for you.  Lung cancer screening: If you are a current or former smoker ages 50 to 80, ask your care team if ongoing lung cancer screenings are right for you.  For informational purposes only. Not to replace the advice of your health care provider. Copyright   2023 [employer]. All rights reserved. Clinically reviewed by the [facility] Transitions Program. [facility] [id number] - REV 01/24.

## 2025-03-27 NOTE — PROGRESS NOTES
ENT Consultation    Walt Tompkins who is a 39 year old male seen in consultation at the request of self.      History of Present Illness - Walt Tompkins is a 39 year old male presents with chief complaint of hearing issue involving left ear and possible tympanic perforation.  He noticed a couple months ago where he had a lot of discomfort and pain in the ear sharp and dull pain and muffled hearing for a while no discharge no bleeding.  Apparently the child had a lot of ear infections no other tubes.  He says he might have always had little bit more issues hearing on the left than the right but did not pay much attention to it.  No current or recent ear infections history noted.  Denies any dizziness or vertigo.  Denies any tinnitus.          BP Readings from Last 1 Encounters:   25 122/84       BP noted to be well controlled today in office.     Walt IS NOT a smoker/uses chewing tobacco.  Patient already quit smoking.      Past Medical History - No past medical history on file.    Current Medications -   Current Outpatient Medications:     allopurinol (ZYLOPRIM) 100 MG tablet, Take 1 tablet (100 mg) by mouth daily., Disp: 90 tablet, Rfl: 1    cholecalciferol (VITAMIN D3 MAXIMUM STRENGTH) 125 MCG (5000 UT) CAPS, Take 1 capsule (5,000 Units) by mouth daily., Disp: 90 capsule, Rfl: 3    indomethacin (INDOCIN) 50 MG capsule, Take 1 capsule (50 mg) by mouth 3 times daily (with meals)., Disp: 21 capsule, Rfl: 2    Allergies - No Known Allergies    Social History -   Social History     Socioeconomic History    Marital status: Single   Tobacco Use    Smoking status: Former     Current packs/day: 0.00     Types: Cigarettes     Quit date: 5/15/2019     Years since quittin.8    Smokeless tobacco: Former    Tobacco comments:     E cig, used to chew   Vaping Use    Vaping status: Former   Substance and Sexual Activity    Alcohol use: Yes     Comment: once per week    Drug use: No    Sexual  activity: Not Currently     Partners: Female   Other Topics Concern    Parent/sibling w/ CABG, MI or angioplasty before 65F 55M? No     Social Drivers of Health     Financial Resource Strain: Low Risk  (1/15/2025)    Financial Resource Strain     Within the past 12 months, have you or your family members you live with been unable to get utilities (heat, electricity) when it was really needed?: No   Food Insecurity: Low Risk  (1/15/2025)    Food Insecurity     Within the past 12 months, did you worry that your food would run out before you got money to buy more?: No     Within the past 12 months, did the food you bought just not last and you didn t have money to get more?: No   Transportation Needs: Low Risk  (1/15/2025)    Transportation Needs     Within the past 12 months, has lack of transportation kept you from medical appointments, getting your medicines, non-medical meetings or appointments, work, or from getting things that you need?: No   Physical Activity: Insufficiently Active (1/15/2025)    Exercise Vital Sign     Days of Exercise per Week: 2 days     Minutes of Exercise per Session: 20 min   Stress: No Stress Concern Present (1/15/2025)    [employer] [place] [employer] - Occupational Stress Questionnaire     Feeling of Stress : Only a little   Social Connections: Unknown (1/15/2025)    Social Connection and Isolation Panel [NHANES]     Frequency of Social Gatherings with Friends and Family: Once a week   Interpersonal Safety: Low Risk  (1/16/2025)    Interpersonal Safety     Do you feel physically and emotionally safe where you currently live?: Yes     Within the past 12 months, have you been hit, slapped, kicked or otherwise physically hurt by someone?: No     Within the past 12 months, have you been humiliated or emotionally abused in other ways by your partner or ex-partner?: No   Housing Stability: Low Risk  (1/15/2025)    Housing Stability     Do you have housing? : Yes     Are you worried  about losing your housing?: No       Family History - No family history on file.    Review of Systems - As per HPI and PMHx, otherwise review of system review of the head and neck negative. Otherwise 10+ review of system is negative    Physical Exam  There were no vitals taken for this visit.  BMI: There is no height or weight on file to calculate BMI.    General - The patient is well nourished and well developed, and appears to have good nutritional status.  Alert and oriented to person and place, answers questions and cooperates with examination appropriately.    SKIN - No suspicious lesions or rashes.  Respiration - No respiratory distress.  Head and Face - Normocephalic and atraumatic, with no gross asymmetry noted of the contour of the facial features.  The facial nerve is intact, with strong symmetric movements.    Voice and Breathing - The patient was breathing comfortably without the use of accessory muscles. The patients voice was clear and strong, and had appropriate pitch and quality.    Ears - Bilateral pinna and EACs with normal appearing overlying skin.  Right tympanic membrane intact with good mobility on pneumatic otoscopy  Bony landmarks of the ossicular chain are normal. The tympanic membrane is normal in appearance. No retraction, perforation, or masses.  No fluid or purulence was seen in the external canal or the middle ear.   On the left side total pinpoint posterior superior tympanic perforation is noted.  Possibly inflammation of the middle ear space without discharge noted through the perforation.  Significant atelectasis of the anterior drum to the promontory noted possible extra squamous material noted.  Pars flaccida appears to be intact.  Eyes - Extraocular movements intact.  Sclera were not icteric or injected, conjunctiva were pink and moist.      Neck - Normal midline excursion of the laryngotracheal complex during swallowing.  Full range of motion on passive movement.  Palpation of  the occipital, submental, submandibular, internal jugular chain, and supraclavicular nodes did not demonstrate any abnormal lymph nodes or masses.  The carotid pulse was palpable bilaterally.  Palpation of the thyroid was soft and smooth, with no nodules or goiter appreciated.  The trachea was mobile and midline.    Nose - External contour is symmetric, no gross deflection or scars.  Nasal mucosa is pink and moist with no abnormal mucus.  The septum was midline and non-obstructive, turbinates of normal size and position.  No polyps, masses, or purulence noted on examination.    Neuro - Nonfocal neuro exam is normal, CN 2 through 12 intact, normal gait and muscle tone.      Performed in clinic today:  Audiologic Studies - An audiogram and tympanogram were performed today as part of the evaluation and personally reviewed. The tympanogram shows Type A curves on the right and Type B curves on the left, with normal on the right and enlarged the left canal volumes and middle ear pressures.  The audiogram showed normal thresholds on the right and moderate conductive loss on the left.        A/P - Walt Tompkins is a 39 year old male patient with moderate conductive loss involving left ear small perforation but also atelectatic drum anteriorly with possible squamous infiltration.  I am concerned about potential cholesteatoma involving left ear.  Patient will be referred to the convening sample for further workup possible CT scan and possibly endoscopic intervention and exploratory procedure of the ear and possible removal of cholesteatoma.      Altaf Hobson MD

## 2025-04-10 ENCOUNTER — OFFICE VISIT (OUTPATIENT)
Dept: AUDIOLOGY | Facility: CLINIC | Age: 40
End: 2025-04-10
Payer: COMMERCIAL

## 2025-04-10 ENCOUNTER — OFFICE VISIT (OUTPATIENT)
Dept: OTOLARYNGOLOGY | Facility: CLINIC | Age: 40
End: 2025-04-10
Payer: COMMERCIAL

## 2025-04-10 VITALS
DIASTOLIC BLOOD PRESSURE: 82 MMHG | RESPIRATION RATE: 16 BRPM | HEART RATE: 85 BPM | SYSTOLIC BLOOD PRESSURE: 122 MMHG | OXYGEN SATURATION: 97 %

## 2025-04-10 DIAGNOSIS — H90.12 CONDUCTIVE HEARING LOSS OF LEFT EAR WITH UNRESTRICTED HEARING OF RIGHT EAR: ICD-10-CM

## 2025-04-10 DIAGNOSIS — H90.12 CONDUCTIVE HEARING LOSS OF LEFT EAR WITH UNRESTRICTED HEARING OF RIGHT EAR: Primary | ICD-10-CM

## 2025-04-10 DIAGNOSIS — H65.492 CHRONIC NONSUPPURATIVE OTITIS MEDIA, LEFT: ICD-10-CM

## 2025-04-10 DIAGNOSIS — H90.11 CONDUCTIVE HEARING LOSS IN RIGHT EAR: Primary | ICD-10-CM

## 2025-04-10 NOTE — LETTER
4/10/2025      Walt Tompkins  [address] [facility] [id number]      Dear Colleague,    Thank you for referring your patient, Walt Tompkins, to the [facility]. Please see a copy of my visit note below.    ENT Consultation    Walt Tompkins who is a 39 year old male seen in consultation at the request of self.      History of Present Illness - Walt Tompkins is a 39 year old male presents with chief complaint of hearing issue involving left ear and possible tympanic perforation.  He noticed a couple months ago where he had a lot of discomfort and pain in the ear sharp and dull pain and muffled hearing for a while no discharge no bleeding.  Apparently the child had a lot of ear infections no other tubes.  He says he might have always had little bit more issues hearing on the left than the right but did not pay much attention to it.  No current or recent ear infections history noted.  Denies any dizziness or vertigo.  Denies any tinnitus.          BP Readings from Last 1 Encounters:   01/16/25 122/84       BP noted to be well controlled today in office.     Walt IS NOT a smoker/uses chewing tobacco.  Patient already quit smoking.      Past Medical History - No past medical history on file.    Current Medications -   Current Outpatient Medications:      allopurinol (ZYLOPRIM) 100 MG tablet, Take 1 tablet (100 mg) by mouth daily., Disp: 90 tablet, Rfl: 1     cholecalciferol (VITAMIN D3 MAXIMUM STRENGTH) 125 MCG (5000 UT) CAPS, Take 1 capsule (5,000 Units) by mouth daily., Disp: 90 capsule, Rfl: 3     indomethacin (INDOCIN) 50 MG capsule, Take 1 capsule (50 mg) by mouth 3 times daily (with meals)., Disp: 21 capsule, Rfl: 2    Allergies - No Known Allergies    Social History -   Social History     Socioeconomic History     Marital status: Single   Tobacco Use     Smoking status: Former     Current packs/day: 0.00     Types: Cigarettes     Quit date:  5/15/2019     Years since quittin.8     Smokeless tobacco: Former     Tobacco comments:     E cig, used to chew   Vaping Use     Vaping status: Former   Substance and Sexual Activity     Alcohol use: Yes     Comment: once per week     Drug use: No     Sexual activity: Not Currently     Partners: Female   Other Topics Concern     Parent/sibling w/ CABG, MI or angioplasty before 65F 55M? No     Social Drivers of Health     Financial Resource Strain: Low Risk  (1/15/2025)    Financial Resource Strain      Within the past 12 months, have you or your family members you live with been unable to get utilities (heat, electricity) when it was really needed?: No   Food Insecurity: Low Risk  (1/15/2025)    Food Insecurity      Within the past 12 months, did you worry that your food would run out before you got money to buy more?: No      Within the past 12 months, did the food you bought just not last and you didn t have money to get more?: No   Transportation Needs: Low Risk  (1/15/2025)    Transportation Needs      Within the past 12 months, has lack of transportation kept you from medical appointments, getting your medicines, non-medical meetings or appointments, work, or from getting things that you need?: No   Physical Activity: Insufficiently Active (1/15/2025)    Exercise Vital Sign      Days of Exercise per Week: 2 days      Minutes of Exercise per Session: 20 min   Stress: No Stress Concern Present (1/15/2025)    [employer] [place] [employer] - Occupational Stress Questionnaire      Feeling of Stress : Only a little   Social Connections: Unknown (1/15/2025)    Social Connection and Isolation Panel [NHANES]      Frequency of Social Gatherings with Friends and Family: Once a week   Interpersonal Safety: Low Risk  (2025)    Interpersonal Safety      Do you feel physically and emotionally safe where you currently live?: Yes      Within the past 12 months, have you been hit, slapped, kicked or otherwise  physically hurt by someone?: No      Within the past 12 months, have you been humiliated or emotionally abused in other ways by your partner or ex-partner?: No   Housing Stability: Low Risk  (1/15/2025)    Housing Stability      Do you have housing? : Yes      Are you worried about losing your housing?: No       Family History - No family history on file.    Review of Systems - As per HPI and PMHx, otherwise review of system review of the head and neck negative. Otherwise 10+ review of system is negative    Physical Exam  There were no vitals taken for this visit.  BMI: There is no height or weight on file to calculate BMI.    General - The patient is well nourished and well developed, and appears to have good nutritional status.  Alert and oriented to person and place, answers questions and cooperates with examination appropriately.    SKIN - No suspicious lesions or rashes.  Respiration - No respiratory distress.  Head and Face - Normocephalic and atraumatic, with no gross asymmetry noted of the contour of the facial features.  The facial nerve is intact, with strong symmetric movements.    Voice and Breathing - The patient was breathing comfortably without the use of accessory muscles. The patients voice was clear and strong, and had appropriate pitch and quality.    Ears - Bilateral pinna and EACs with normal appearing overlying skin.  Right tympanic membrane intact with good mobility on pneumatic otoscopy  Bony landmarks of the ossicular chain are normal. The tympanic membrane is normal in appearance. No retraction, perforation, or masses.  No fluid or purulence was seen in the external canal or the middle ear.   On the left side total pinpoint posterior superior tympanic perforation is noted.  Possibly inflammation of the middle ear space without discharge noted through the perforation.  Significant atelectasis of the anterior drum to the promontory noted possible extra squamous material noted.  Pars flaccida  appears to be intact.  Eyes - Extraocular movements intact.  Sclera were not icteric or injected, conjunctiva were pink and moist.      Neck - Normal midline excursion of the laryngotracheal complex during swallowing.  Full range of motion on passive movement.  Palpation of the occipital, submental, submandibular, internal jugular chain, and supraclavicular nodes did not demonstrate any abnormal lymph nodes or masses.  The carotid pulse was palpable bilaterally.  Palpation of the thyroid was soft and smooth, with no nodules or goiter appreciated.  The trachea was mobile and midline.    Nose - External contour is symmetric, no gross deflection or scars.  Nasal mucosa is pink and moist with no abnormal mucus.  The septum was midline and non-obstructive, turbinates of normal size and position.  No polyps, masses, or purulence noted on examination.    Neuro - Nonfocal neuro exam is normal, CN 2 through 12 intact, normal gait and muscle tone.      Performed in clinic today:  Audiologic Studies - An audiogram and tympanogram were performed today as part of the evaluation and personally reviewed. The tympanogram shows Type A curves on the right and Type B curves on the left, with normal on the right and enlarged the left canal volumes and middle ear pressures.  The audiogram showed normal thresholds on the right and moderate conductive loss on the left.        A/P - Walt Tompkins is a 39 year old male patient with moderate conductive loss involving left ear small perforation but also atelectatic drum anteriorly with possible squamous infiltration.  I am concerned about potential cholesteatoma involving left ear.  Patient will be referred to the convening sample for further workup possible CT scan and possibly endoscopic intervention and exploratory procedure of the ear and possible removal of cholesteatoma.      Altaf Hobson MD      Again, thank you for allowing me to participate in the care of your patient.         Sincerely,        Altaf Hobson MD, MD    Electronically signed

## 2025-04-10 NOTE — PROGRESS NOTES
AUDIOLOGY REPORT:    Patient was referred to [facility] from ENT by Dr. Hobson for a hearing examination. He reports that he noticed a TM perforation approximately 2 months ago in the left ear.  He alos notes a history of left ear middle ear issues starting as a child.  He denies hearing loss in the right ear and the left ear is quieter than the right. They were unaccompanied today.      Testing:    Otoscopy:   Otoscopic exam indicates ears are clear of cerumen bilaterally.  TM perforation visualized in the left ear.     Tympanograms:    RIGHT: normal eardrum mobility     LEFT:   large ear canal volume consistent with tympanic membrane perforation    Reflexes (reported by stimulus ear): 1000 Hz  RIGHT: Ipsilateral is absent at frequencies tested  RIGHT: Contralateral is absent at frequencies tested  LEFT:   Ipsilateral is elevated at frequencies tested  LEFT:   Contralateral is absent at frequencies tested    Thresholds:   Pure Tone Thresholds assessed using conventional audiometry with good  reliability from 250-8000 Hz bilaterally using insert earphones and circumaural headphones     RIGHT:  normal     LEFT:    moderate sloping to  normal conductive hearing loss    Speech Reception Threshold:    RIGHT: 10 dB HL    LEFT:   40 dB HL  Speech Reception Thresholds are in good agreement with pure tone thresholds    Word Recognition Score:     RIGHT: 100% at 50 dB HL using NU-6 recorded word list.    LEFT:   100% at 80 dB HL using NU-6 recorded word list.    Discussed results with the patient.     Patient was returned to ENT for follow up.     Philomena Trimble.   Doctor of Audiology  License #[id number]

## 2025-04-20 ENCOUNTER — MYC REFILL (OUTPATIENT)
Dept: FAMILY MEDICINE | Facility: OTHER | Age: 40
End: 2025-04-20
Payer: COMMERCIAL

## 2025-04-20 DIAGNOSIS — M10.9 ACUTE GOUTY ARTHRITIS: ICD-10-CM

## 2025-04-21 ENCOUNTER — E-VISIT (OUTPATIENT)
Dept: FAMILY MEDICINE | Facility: OTHER | Age: 40
End: 2025-04-21
Payer: COMMERCIAL

## 2025-04-21 DIAGNOSIS — M10.9 ACUTE GOUTY ARTHRITIS: ICD-10-CM

## 2025-04-21 PROCEDURE — 99207 PR NON-BILLABLE SERV PER CHARTING: CPT | Performed by: PHYSICIAN ASSISTANT

## 2025-04-21 RX ORDER — COLCHICINE 0.6 MG/1
TABLET ORAL
Qty: 12 TABLET | Refills: 0 | Status: SHIPPED | OUTPATIENT
Start: 2025-04-21

## 2025-04-21 RX ORDER — INDOMETHACIN 50 MG/1
50 CAPSULE ORAL
Qty: 21 CAPSULE | Refills: 2 | Status: SHIPPED | OUTPATIENT
Start: 2025-04-21

## 2025-04-21 RX ORDER — ALLOPURINOL 100 MG/1
100 TABLET ORAL DAILY
Qty: 90 TABLET | Refills: 1 | Status: SHIPPED | OUTPATIENT
Start: 2025-04-21

## 2025-04-29 ENCOUNTER — MYC REFILL (OUTPATIENT)
Dept: FAMILY MEDICINE | Facility: OTHER | Age: 40
End: 2025-04-29
Payer: COMMERCIAL

## 2025-04-29 DIAGNOSIS — M10.9 ACUTE GOUTY ARTHRITIS: ICD-10-CM

## 2025-04-29 RX ORDER — INDOMETHACIN 50 MG/1
50 CAPSULE ORAL
Qty: 21 CAPSULE | Refills: 2 | Status: CANCELLED | OUTPATIENT
Start: 2025-04-29

## 2025-04-29 RX ORDER — ALLOPURINOL 100 MG/1
100 TABLET ORAL DAILY
Qty: 90 TABLET | Refills: 1 | OUTPATIENT
Start: 2025-04-29

## 2025-04-29 RX ORDER — COLCHICINE 0.6 MG/1
TABLET ORAL
Qty: 12 TABLET | Refills: 0 | Status: CANCELLED | OUTPATIENT
Start: 2025-04-29

## 2025-05-15 ENCOUNTER — OFFICE VISIT (OUTPATIENT)
Dept: FAMILY MEDICINE | Facility: CLINIC | Age: 40
End: 2025-05-15
Payer: COMMERCIAL

## 2025-05-15 ENCOUNTER — RESULTS FOLLOW-UP (OUTPATIENT)
Dept: FAMILY MEDICINE | Facility: CLINIC | Age: 40
End: 2025-05-15

## 2025-05-15 VITALS
WEIGHT: 246 LBS | RESPIRATION RATE: 15 BRPM | OXYGEN SATURATION: 97 % | SYSTOLIC BLOOD PRESSURE: 130 MMHG | DIASTOLIC BLOOD PRESSURE: 86 MMHG | TEMPERATURE: 97.1 F | HEART RATE: 77 BPM | BODY MASS INDEX: 33.32 KG/M2 | HEIGHT: 72 IN

## 2025-05-15 DIAGNOSIS — M10.9 ACUTE GOUTY ARTHRITIS: Primary | ICD-10-CM

## 2025-05-15 LAB
BASOPHILS # BLD AUTO: 0 10E3/UL (ref 0–0.2)
BASOPHILS NFR BLD AUTO: 0 %
EOSINOPHIL # BLD AUTO: 0.2 10E3/UL (ref 0–0.7)
EOSINOPHIL NFR BLD AUTO: 2 %
ERYTHROCYTE [DISTWIDTH] IN BLOOD BY AUTOMATED COUNT: 11.8 % (ref 10–15)
HCT VFR BLD AUTO: 42.9 % (ref 40–53)
HGB BLD-MCNC: 15.1 G/DL (ref 13.3–17.7)
IMM GRANULOCYTES # BLD: 0 10E3/UL
IMM GRANULOCYTES NFR BLD: 0 %
LYMPHOCYTES # BLD AUTO: 2.5 10E3/UL (ref 0.8–5.3)
LYMPHOCYTES NFR BLD AUTO: 27 %
MCH RBC QN AUTO: 30.3 PG (ref 26.5–33)
MCHC RBC AUTO-ENTMCNC: 35.2 G/DL (ref 31.5–36.5)
MCV RBC AUTO: 86 FL (ref 78–100)
MONOCYTES # BLD AUTO: 0.7 10E3/UL (ref 0–1.3)
MONOCYTES NFR BLD AUTO: 7 %
NEUTROPHILS # BLD AUTO: 5.8 10E3/UL (ref 1.6–8.3)
NEUTROPHILS NFR BLD AUTO: 63 %
PLATELET # BLD AUTO: 213 10E3/UL (ref 150–450)
RBC # BLD AUTO: 4.98 10E6/UL (ref 4.4–5.9)
URATE SERPL-MCNC: 5.5 MG/DL (ref 3.4–7)
WBC # BLD AUTO: 9.2 10E3/UL (ref 4–11)

## 2025-05-15 RX ORDER — ALLOPURINOL 100 MG/1
200 TABLET ORAL DAILY
Qty: 180 TABLET | Refills: 0 | Status: SHIPPED | OUTPATIENT
Start: 2025-05-15

## 2025-05-15 RX ORDER — PREDNISONE 20 MG/1
TABLET ORAL
Qty: 8 TABLET | Refills: 0 | Status: SHIPPED | OUTPATIENT
Start: 2025-05-15 | End: 2025-05-25

## 2025-05-15 RX ORDER — COLCHICINE 0.6 MG/1
TABLET ORAL
Qty: 32 TABLET | Refills: 1 | Status: SHIPPED | OUTPATIENT
Start: 2025-05-15 | End: 2025-06-14

## 2025-05-15 ASSESSMENT — PAIN SCALES - GENERAL: PAINLEVEL_OUTOF10: SEVERE PAIN (7)

## 2025-05-15 NOTE — PATIENT INSTRUCTIONS
Regarding your Gout:  Gout is a type of arthritis caused by crystals that form in your joints.   The crystals are made up of uric acid.  Uric acid is builds up in our bodies as we breakdown purines, substances found in certain foods.  This build-up can lead to sudden and severe pain, swelling, and tenderness, commonly affecting the big toe. It can occur in any joint however.    Goals of Gout Management:  To reduce the frequency of painful flares   Lower uric acid levels in the blood.   The target uric acid level is < 6 mg/dL    Understanding the causes and adopting lifestyle changes are crucial for managing and preventing gout flares.  Stay Hydrated: Drink plenty of water to help flush out and dilute uric acid.  Adopt a Balanced Diet: Choose low-purine foods and maintain a healthy weight.  Limit Alcohol Intake: Especially beer, as it can contribute to gout flares.  Medication Adherence: Take prescribed medications as directed by myself, or another healthcare provider.    Untreated, or poorly managed gout with persistent high uric acid levels can lead to complications, including:  Joint Damage: Over time, repeated gout attacks can damage joints, causing deformities.  Kidney Stones: Uric acid crystals can also form kidney stones, leading to kidney problems.  Tophi Formation: Uric acid crystals can develop under the skin, forming nodules called tophi.    Medications commonly used to treat gout:  These medications aim to reduce inflammation, alleviate pain during gout attacks (As needed):  Nonsteroidal Anti-Inflammatory Drugs (NSAIDs):        Examples: Ibuprofen, Naproxen, and indomethacin (prescription only)  Colchicine (prescription only)  Steroids:        Examples: Prednisone, Methylprednisolone   Corticosteroids may also be injected into the affected joint to quickly reduce inflammation and relieve pain during a flare.    These medications help lower uric acid levels and prevent crystals from forming  (Daily):  Xanthine Oxidase Inhibitors: Reduce the production of uric acid in the body  Examples: Allopurinol, Febuxostat      High-Purine Foods to Avoid in Gout:    Organ Meats: Liver, kidneys, and sweetbreads.    Red Meat: Beef, lamb, and pork    Game Meats: Venison and rabbit    Seafood: Anchovies, sardines, mackerel, herring, and shellfish such as shrimp and scallops.    Gravy and Broths: Stocks and gravies made from meat extracts can be concentrated sources of purines.    Beer: Beer, particularly dark beers    High-Fructose Corn Syrup: Beverages and foods containing high-fructose corn syrup may contribute to gout, so it's advisable to limit their intake.    Yeast Extracts: Yeast-based products, such as yeast extracts and some types of bread    Asparagus: While vegetables generally have lower purine content, asparagus is an exception and should be consumed in moderation by individuals with gout.    It's important to note that individual responses to purine-containing foods can vary, and not all individuals with gout will react the same way to these foods. However, for those managing gout, a balanced and low-purine diet can be beneficial in reducing the frequency of gout flares.     Consulting with a registered dietitian for personalized dietary recommendations is advisable for individuals with gout. I can place a referral if requested.    ]

## (undated) DEVICE — DRSG STERI STRIP 1/2X4" R1547

## (undated) DEVICE — PEN MARKING SKIN

## (undated) DEVICE — ADH SKIN CLOSURE PREMIERPRO EXOFIN 1.0ML 3470

## (undated) DEVICE — ADH LIQUID MASTISOL TOPICAL VIAL 2-3ML 0523-48

## (undated) DEVICE — GLOVE ESTEEM POWDER FREE 5.5  2D72PL55

## (undated) DEVICE — ESU ELEC BLADE 2.75" COATED/INSULATED E1455

## (undated) DEVICE — BLADE KNIFE SURG 15 371115

## (undated) DEVICE — GLOVE PROTEXIS W/NEU-THERA 6.0  2D73TE60

## (undated) DEVICE — SU MONOCRYL 4-0 PS-2 18" UND Y496G

## (undated) DEVICE — PACK MINOR PROCEDURE CUSTOM

## (undated) RX ORDER — FENTANYL CITRATE 50 UG/ML
INJECTION, SOLUTION INTRAMUSCULAR; INTRAVENOUS
Status: DISPENSED
Start: 2019-08-05

## (undated) RX ORDER — LIDOCAINE HYDROCHLORIDE 20 MG/ML
INJECTION, SOLUTION EPIDURAL; INFILTRATION; INTRACAUDAL; PERINEURAL
Status: DISPENSED
Start: 2019-08-05

## (undated) RX ORDER — LIDOCAINE HYDROCHLORIDE 10 MG/ML
INJECTION, SOLUTION EPIDURAL; INFILTRATION; INTRACAUDAL; PERINEURAL
Status: DISPENSED
Start: 2019-08-05

## (undated) RX ORDER — PROPOFOL 10 MG/ML
INJECTION, EMULSION INTRAVENOUS
Status: DISPENSED
Start: 2019-08-05

## (undated) RX ORDER — BUPIVACAINE HYDROCHLORIDE AND EPINEPHRINE 2.5; 5 MG/ML; UG/ML
INJECTION, SOLUTION EPIDURAL; INFILTRATION; INTRACAUDAL; PERINEURAL
Status: DISPENSED
Start: 2019-08-05

## (undated) RX ORDER — HYDROMORPHONE HYDROCHLORIDE 1 MG/ML
INJECTION, SOLUTION INTRAMUSCULAR; INTRAVENOUS; SUBCUTANEOUS
Status: DISPENSED
Start: 2019-08-05